# Patient Record
Sex: FEMALE | Race: WHITE | NOT HISPANIC OR LATINO | Employment: OTHER | URBAN - METROPOLITAN AREA
[De-identification: names, ages, dates, MRNs, and addresses within clinical notes are randomized per-mention and may not be internally consistent; named-entity substitution may affect disease eponyms.]

---

## 2023-08-01 ENCOUNTER — OFFICE VISIT (OUTPATIENT)
Dept: FAMILY MEDICINE CLINIC | Facility: CLINIC | Age: 68
End: 2023-08-01
Payer: MEDICARE

## 2023-08-01 VITALS
HEART RATE: 66 BPM | TEMPERATURE: 98.6 F | OXYGEN SATURATION: 96 % | RESPIRATION RATE: 16 BRPM | WEIGHT: 128.2 LBS | BODY MASS INDEX: 20.12 KG/M2 | SYSTOLIC BLOOD PRESSURE: 134 MMHG | DIASTOLIC BLOOD PRESSURE: 76 MMHG | HEIGHT: 67 IN

## 2023-08-01 DIAGNOSIS — R42 VERTIGO: ICD-10-CM

## 2023-08-01 DIAGNOSIS — Z13.820 SCREENING FOR OSTEOPOROSIS: ICD-10-CM

## 2023-08-01 DIAGNOSIS — Z12.11 SCREENING FOR COLON CANCER: ICD-10-CM

## 2023-08-01 DIAGNOSIS — Z78.0 ASYMPTOMATIC POSTMENOPAUSAL STATE: ICD-10-CM

## 2023-08-01 DIAGNOSIS — E55.9 VITAMIN D DEFICIENCY: ICD-10-CM

## 2023-08-01 DIAGNOSIS — Z12.31 ENCOUNTER FOR SCREENING MAMMOGRAM FOR BREAST CANCER: ICD-10-CM

## 2023-08-01 DIAGNOSIS — Z00.00 MEDICARE ANNUAL WELLNESS VISIT, SUBSEQUENT: Primary | ICD-10-CM

## 2023-08-01 PROCEDURE — G0438 PPPS, INITIAL VISIT: HCPCS | Performed by: FAMILY MEDICINE

## 2023-08-01 PROCEDURE — 99213 OFFICE O/P EST LOW 20 MIN: CPT | Performed by: FAMILY MEDICINE

## 2023-08-01 RX ORDER — MULTIVIT WITH MINERALS/LUTEIN
1000 TABLET ORAL DAILY
COMMUNITY

## 2023-08-01 RX ORDER — MECLIZINE HYDROCHLORIDE 25 MG/1
1 TABLET ORAL
COMMUNITY
Start: 2012-02-20

## 2023-08-01 RX ORDER — VITAMIN B COMPLEX
1 CAPSULE ORAL DAILY
COMMUNITY

## 2023-08-01 RX ORDER — CHOLECALCIFEROL (VITAMIN D3) 125 MCG
500 CAPSULE ORAL DAILY
COMMUNITY

## 2023-08-01 RX ORDER — MELATONIN
5000 DAILY
COMMUNITY

## 2023-08-01 NOTE — PATIENT INSTRUCTIONS
Medicare Preventive Visit Patient Instructions  Thank you for completing your Welcome to Medicare Visit or Medicare Annual Wellness Visit today. Your next wellness visit will be due in one year (8/1/2024). The screening/preventive services that you may require over the next 5-10 years are detailed below. Some tests may not apply to you based off risk factors and/or age. Screening tests ordered at today's visit but not completed yet may show as past due. Also, please note that scanned in results may not display below. Preventive Screenings:  Service Recommendations Previous Testing/Comments   Colorectal Cancer Screening  * Colonoscopy    * Fecal Occult Blood Test (FOBT)/Fecal Immunochemical Test (FIT)  * Fecal DNA/Cologuard Test  * Flexible Sigmoidoscopy Age: 43-73 years old   Colonoscopy: every 10 years (may be performed more frequently if at higher risk)  OR  FOBT/FIT: every 1 year  OR  Cologuard: every 3 years  OR  Sigmoidoscopy: every 5 years  Screening may be recommended earlier than age 39 if at higher risk for colorectal cancer. Also, an individualized decision between you and your healthcare provider will decide whether screening between the ages of 77-80 would be appropriate. Colonoscopy: Not on file  FOBT/FIT: Not on file  Cologuard: Not on file  Sigmoidoscopy: Not on file          Breast Cancer Screening Age: 36 years old  Frequency: every 1-2 years  Not required if history of left and right mastectomy Mammogram: Not on file        Cervical Cancer Screening Between the ages of 21-29, pap smear recommended once every 3 years. Between the ages of 32-69, can perform pap smear with HPV co-testing every 5 years.    Recommendations may differ for women with a history of total hysterectomy, cervical cancer, or abnormal pap smears in past. Pap Smear: Not on file    Screening Not Indicated   Hepatitis C Screening Once for adults born between [address] [place] [address]  More frequently in patients at high risk for Hepatitis C Hep C Antibody: Not on file        Diabetes Screening 1-2 times per year if you're at risk for diabetes or have pre-diabetes Fasting glucose: No results in last 5 years (No results in last 5 years)  A1C: No results in last 5 years (No results in last 5 years)      Cholesterol Screening Once every 5 years if you don't have a lipid disorder. May order more often based on risk factors. Lipid panel: Not on file          Other Preventive Screenings Covered by Medicare:  1. Abdominal Aortic Aneurysm (AAA) Screening: covered once if your at risk. You're considered to be at risk if you have a family history of AAA. 2. Lung Cancer Screening: covers low dose CT scan once per year if you meet all of the following conditions: (1) Age 48-67; (2) No signs or symptoms of lung cancer; (3) Current smoker or have quit smoking within the last 15 years; (4) You have a tobacco smoking history of at least 20 pack years (packs per day multiplied by number of years you smoked); (5) You get a written order from a healthcare provider. 3. Glaucoma Screening: covered annually if you're considered high risk: (1) You have diabetes OR (2) Family history of glaucoma OR (3)  aged 48 and older OR (3)  American aged 72 and older  3. Osteoporosis Screening: covered every 2 years if you meet one of the following conditions: (1) You're estrogen deficient and at risk for osteoporosis based off medical history and other findings; (2) Have a vertebral abnormality; (3) On glucocorticoid therapy for more than 3 months; (4) Have primary hyperparathyroidism; (5) On osteoporosis medications and need to assess response to drug therapy. · Last bone density test (DXA Scan): Not on file. 5. HIV Screening: covered annually if you're between the age of 14-79. Also covered annually if you are younger than 13 and older than 72 with risk factors for HIV infection.  For pregnant patients, it is covered up to 3 times per pregnancy. Immunizations:  Immunization Recommendations   Influenza Vaccine Annual influenza vaccination during flu season is recommended for all persons aged >= 6 months who do not have contraindications   Pneumococcal Vaccine   * Pneumococcal conjugate vaccine = PCV13 (Prevnar 13), PCV15 (Vaxneuvance), PCV20 (Prevnar 20)  * Pneumococcal polysaccharide vaccine = PPSV23 (Pneumovax) Adults 20-63 years old: 1-3 doses may be recommended based on certain risk factors  Adults 72 years old: 1-2 doses may be recommended based off what pneumonia vaccine you previously received   Hepatitis B Vaccine 3 dose series if at intermediate or high risk (ex: diabetes, end stage renal disease, liver disease)   Tetanus (Td) Vaccine - COST NOT COVERED BY MEDICARE PART B Following completion of primary series, a booster dose should be given every 10 years to maintain immunity against tetanus. Td may also be given as tetanus wound prophylaxis. Tdap Vaccine - COST NOT COVERED BY MEDICARE PART B Recommended at least once for all adults. For pregnant patients, recommended with each pregnancy. Shingles Vaccine (Shingrix) - COST NOT COVERED BY MEDICARE PART B  2 shot series recommended in those aged 48 and above     Health Maintenance Due:      Topic Date Due   • Hepatitis C Screening  Never done   • Breast Cancer Screening: Mammogram  Never done   • Colorectal Cancer Screening  Never done     Immunizations Due:      Topic Date Due   • COVID-19 Vaccine (1) Never done   • Pneumococcal Vaccine: 65+ Years (1 - PCV) Never done   • Influenza Vaccine (1) 09/01/2023     Advance Directives   What are advance directives? Advance directives are legal documents that state your wishes and plans for medical care. These plans are made ahead of time in case you lose your ability to make decisions for yourself. Advance directives can apply to any medical decision, such as the treatments you want, and if you want to donate organs.    What are the types of advance directives? There are many types of advance directives, and each state has rules about how to use them. You may choose a combination of any of the following:  · Living will: This is a written record of the treatment you want. You can also choose which treatments you do not want, which to limit, and which to stop at a certain time. This includes surgery, medicine, IV fluid, and tube feedings. · Durable power of  for healthcare [facility] [place]): This is a written record that states who you want to make healthcare choices for you when you are unable to make them for yourself. This person, called a proxy, is usually a family member or a friend. You may choose more than 1 proxy. · Do not resuscitate (DNR) order:  A DNR order is used in case your heart stops beating or you stop breathing. It is a request not to have certain forms of treatment, such as CPR. A DNR order may be included in other types of advance directives. · Medical directive: This covers the care that you want if you are in a coma, near death, or unable to make decisions for yourself. You can list the treatments you want for each condition. Treatment may include pain medicine, surgery, blood transfusions, dialysis, IV or tube feedings, and a ventilator (breathing machine). · Values history: This document has questions about your views, beliefs, and how you feel and think about life. This information can help others choose the care that you would choose. Why are advance directives important? An advance directive helps you control your care. Although spoken wishes may be used, it is better to have your wishes written down. Spoken wishes can be misunderstood, or not followed. Treatments may be given even if you do not want them. An advance directive may make it easier for your family to make difficult choices about your care.        © Copyright [employer] 2018 Information is for End User's use only and may not be sold, redistributed or otherwise used for commercial purposes.  All illustrations and images included in CareNotes® are the copyrighted property of [employer].D.A[employer]M., Inc. or [address] [place] [address]

## 2023-08-01 NOTE — ASSESSMENT & PLAN NOTE
Discussed recurrent symptoms. She has Meclizine for use as needed. Provided with information on Epley maneuver for home treatment during an episode and provided with referral for vestibular therapy.
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
## 2023-08-09 ENCOUNTER — EVALUATION (OUTPATIENT)
Dept: PHYSICAL THERAPY | Facility: CLINIC | Age: 68
End: 2023-08-09
Payer: MEDICARE

## 2023-08-09 DIAGNOSIS — R42 VERTIGO: Primary | ICD-10-CM

## 2023-08-09 PROCEDURE — 97162 PT EVAL MOD COMPLEX 30 MIN: CPT | Performed by: PHYSICAL THERAPIST

## 2023-08-09 NOTE — PROGRESS NOTES
PT Evaluation     Today's date: 2023  Patient name: Tia Love  : 1955  MRN: [id number]  Referring provider: Rupa Calvin DO  Dx:   Encounter Diagnosis     ICD-10-CM    1. Vertigo  R42 Ambulatory Referral to Physical Therapy                     Assessment  Assessment details: Pt is a 69y.o. year old female coming to outpatient PT with a diagnosis of vertigo. Pt presents with increased pain, mild cervical ROM deficits, increased dizziness or "swimminess", and overall decreased functional mobility. Pt would benefit from skilled PT services in order to address these deficits and reach maximum level of function. Thank you kindly for the referral!    Ortho therapist was not able to reproduce pt's dizziness symptoms. Recommend a consult with neuro PT to assess other vestibular disorders. Impairments: activity intolerance, lacks appropriate home exercise program and pain with function  Other impairment: dizziness  Understanding of Dx/Px/POC: good   Prognosis: good    Goals  STG's ( 3-4 weeks)  1. Pt will be independent in HEP  2.  Pt will have decreased dizziness re occurence  LTG's ( 6- 8 weeks)  1. Pt will feel more comfortable exercising with less dizziness  2. Pt will have less dizziness when looking up in the store or at the [employer]  3. Plan  Patient would benefit from: skilled physical therapy  Planned therapy interventions: home exercise program, therapeutic exercise, strengthening, functional ROM exercises, balance, manual therapy and neuromuscular re-education  Frequency: 2x week  Duration in weeks: 8  Plan of Care beginning date: 2023  Plan of Care expiration date: 10/4/2023  Treatment plan discussed with: patient        Subjective Evaluation    History of Present Illness  Mechanism of injury:  Pt reports she has been having reoccurring episodes of dizziness and vomiting since 2023. Pt has dizziness every day, but some days are are worse with vomiting.  Pt was given Meclizine when she went to Urgent Care, with some relief. Pt is now taking a homepathic medication from the [employer] store that seems to be helping more than the Meclizine. Pt feels "spacy and swimmy" when she wakes up in the morning. Pt has increased dizziness if she turns her head to quickly, rolling in bed, laying supine in bed. Pt denies dizziness when bending forward to put on her shoes and socks. Pt has increased dizziness when looking up at birds outside or looking up at [facility]. Pt has not been walking recently 2* not trusting herself with her balance and walking. Pt has had some neck pain 2* dizziness. Pt notes she had a R ear wax problem/ impacted wax.     Work: retired  Hobbies: sun bathing, swimming at [employer], walking, stepping machine at home  Gait: no abnormalities  Patient Goals  Patient goal: to decrease my dizziness  Pain  At best pain ratin  At worst pain ratin    Social Support    Employment status: not working  Treatments  Previous treatment: medication        Objective     Neurological Testing     Sensation   Cervical/Thoracic   Left   Intact: light touch    Right   Intact: light touch    Reflexes   Left   Biceps (C5/C6): normal (2+)  Brachioradialis (C6): normal (2+)  Triceps (C7): normal (2+)    Right   Biceps (C5/C6): normal (2+)  Brachioradialis (C6): normal (2+)  Triceps (C7): normal (2+)    Active Range of Motion   Cervical/Thoracic Spine       Cervical    Flexion:  WFL  Extension: 60 degrees      Left lateral flexion: 25 degrees      Right lateral flexion: 30 degrees      Left rotation:  WFL  Right rotation:  WFL  Left Shoulder   Normal active range of motion    Right Shoulder   Normal active range of motion    Additional Active Range of Motion Details  Vestibular Examination:    Primary Complaints:    Exacerbating Factors:    Relieving Factors:    Modified CT SIB testing:  EO on firm surface:    sway index  EC on firm surface:   sway index  EO on foam surface:   sway index  EC on form surface:   sway index    Spontaneous nystagmus room light: negative  Gaze holding nystagmus room light: negative  Skew deviation room light: negative  Smooth pursuits:  vertical: normal    Horizontal: normal  Horizontal head thrust test: normal  VOR cancellation: 10 reps with "swimminess"  Near point convergence: normal 4 cm   ( normal 4- 6 cm)  Oculomotor mobility: good  Sharp Guera test: negative  Vertebral Artery Test: negative  Mine Hallpike test: negative bilaterally      Strength/Myotome Testing     Left Shoulder   Normal muscle strength    Right Shoulder   Normal muscle strength        Dx: vertigo  EPOC:  CO-MORBIDITIES: chronic neck pain  PERSONAL FACTORS:    Precautions: h/o vomiting with dizziness      Manuals                                                                 Neuro Re-Ed             VOR x1 Horiz             VOR x1 vert             Sidestepping on foam             Tandem walking on foam             Walking with HT                                       Ther Ex                                                                                                                     Ther Activity                                       Gait Training                                       Modalities

## 2023-08-10 ENCOUNTER — HOSPITAL ENCOUNTER (OUTPATIENT)
Dept: BONE DENSITY | Facility: IMAGING CENTER | Age: 68
Discharge: HOME | End: 2023-08-10
Payer: MEDICARE

## 2023-08-10 VITALS — BODY MASS INDEX: 22.41 KG/M2 | HEIGHT: 63 IN | WEIGHT: 126.5 LBS

## 2023-08-10 DIAGNOSIS — Z78.0 ASYMPTOMATIC POSTMENOPAUSAL STATE: ICD-10-CM

## 2023-08-10 DIAGNOSIS — Z13.820 SCREENING FOR OSTEOPOROSIS: ICD-10-CM

## 2023-08-10 PROCEDURE — 77080 DXA BONE DENSITY AXIAL: CPT

## 2023-08-11 ENCOUNTER — APPOINTMENT (OUTPATIENT)
Dept: LAB | Facility: HOSPITAL | Age: 68
End: 2023-08-11
Payer: MEDICARE

## 2023-08-11 DIAGNOSIS — Z00.00 MEDICARE ANNUAL WELLNESS VISIT, SUBSEQUENT: ICD-10-CM

## 2023-08-11 DIAGNOSIS — E55.9 VITAMIN D DEFICIENCY: ICD-10-CM

## 2023-08-11 LAB
25(OH)D3 SERPL-MCNC: 69.1 NG/ML (ref 30–100)
ALBUMIN SERPL BCP-MCNC: 4.3 G/DL (ref 3.5–5)
ALP SERPL-CCNC: 53 U/L (ref 34–104)
ALT SERPL W P-5'-P-CCNC: 15 U/L (ref 7–52)
ANION GAP SERPL CALCULATED.3IONS-SCNC: 7 MMOL/L
AST SERPL W P-5'-P-CCNC: 18 U/L (ref 13–39)
BASOPHILS # BLD AUTO: 0.12 THOUSANDS/ÂΜL (ref 0–0.1)
BASOPHILS NFR BLD AUTO: 1 % (ref 0–1)
BILIRUB SERPL-MCNC: 0.57 MG/DL (ref 0.2–1)
BUN SERPL-MCNC: 12 MG/DL (ref 5–25)
CALCIUM SERPL-MCNC: 9.2 MG/DL (ref 8.4–10.2)
CHLORIDE SERPL-SCNC: 106 MMOL/L (ref 96–108)
CHOLEST SERPL-MCNC: 210 MG/DL
CO2 SERPL-SCNC: 26 MMOL/L (ref 21–32)
CREAT SERPL-MCNC: 0.71 MG/DL (ref 0.6–1.3)
EOSINOPHIL # BLD AUTO: 0.38 THOUSAND/ÂΜL (ref 0–0.61)
EOSINOPHIL NFR BLD AUTO: 4 % (ref 0–6)
ERYTHROCYTE [DISTWIDTH] IN BLOOD BY AUTOMATED COUNT: 13.7 % (ref 11.6–15.1)
GFR SERPL CREATININE-BSD FRML MDRD: 87 ML/MIN/1.73SQ M
GLUCOSE P FAST SERPL-MCNC: 89 MG/DL (ref 65–99)
HCT VFR BLD AUTO: 42.1 % (ref 34.8–46.1)
HDLC SERPL-MCNC: 47 MG/DL
HGB BLD-MCNC: 13.6 G/DL (ref 11.5–15.4)
IMM GRANULOCYTES # BLD AUTO: 0.02 THOUSAND/UL (ref 0–0.2)
IMM GRANULOCYTES NFR BLD AUTO: 0 % (ref 0–2)
LDLC SERPL CALC-MCNC: 134 MG/DL (ref 0–100)
LYMPHOCYTES # BLD AUTO: 3.54 THOUSANDS/ÂΜL (ref 0.6–4.47)
LYMPHOCYTES NFR BLD AUTO: 41 % (ref 14–44)
MCH RBC QN AUTO: 30.4 PG (ref 26.8–34.3)
MCHC RBC AUTO-ENTMCNC: 32.3 G/DL (ref 31.4–37.4)
MCV RBC AUTO: 94 FL (ref 82–98)
MONOCYTES # BLD AUTO: 0.69 THOUSAND/ÂΜL (ref 0.17–1.22)
MONOCYTES NFR BLD AUTO: 8 % (ref 4–12)
NEUTROPHILS # BLD AUTO: 3.8 THOUSANDS/ÂΜL (ref 1.85–7.62)
NEUTS SEG NFR BLD AUTO: 46 % (ref 43–75)
NRBC BLD AUTO-RTO: 0 /100 WBCS
PLATELET # BLD AUTO: 306 THOUSANDS/UL (ref 149–390)
PMV BLD AUTO: 10.3 FL (ref 8.9–12.7)
POTASSIUM SERPL-SCNC: 3.6 MMOL/L (ref 3.5–5.3)
PROT SERPL-MCNC: 7.4 G/DL (ref 6.4–8.4)
RBC # BLD AUTO: 4.47 MILLION/UL (ref 3.81–5.12)
SODIUM SERPL-SCNC: 139 MMOL/L (ref 135–147)
TRIGL SERPL-MCNC: 146 MG/DL
TSH SERPL DL<=0.05 MIU/L-ACNC: 1.75 UIU/ML (ref 0.45–4.5)
WBC # BLD AUTO: 8.55 THOUSAND/UL (ref 4.31–10.16)

## 2023-08-11 PROCEDURE — 84443 ASSAY THYROID STIM HORMONE: CPT

## 2023-08-11 PROCEDURE — 80053 COMPREHEN METABOLIC PANEL: CPT

## 2023-08-11 PROCEDURE — 82306 VITAMIN D 25 HYDROXY: CPT

## 2023-08-11 PROCEDURE — 83036 HEMOGLOBIN GLYCOSYLATED A1C: CPT

## 2023-08-11 PROCEDURE — 36415 COLL VENOUS BLD VENIPUNCTURE: CPT

## 2023-08-11 PROCEDURE — 80061 LIPID PANEL: CPT

## 2023-08-11 PROCEDURE — 85025 COMPLETE CBC W/AUTO DIFF WBC: CPT

## 2023-08-13 LAB
EST. AVERAGE GLUCOSE BLD GHB EST-MCNC: 120 MG/DL
HBA1C MFR BLD: 5.8 %

## 2023-08-17 ENCOUNTER — TELEPHONE (OUTPATIENT)
Dept: FAMILY MEDICINE CLINIC | Facility: CLINIC | Age: 68
End: 2023-08-17

## 2023-08-17 ENCOUNTER — EVALUATION (OUTPATIENT)
Facility: CLINIC | Age: 68
End: 2023-08-17
Payer: MEDICARE

## 2023-08-17 DIAGNOSIS — R42 VERTIGO: Primary | ICD-10-CM

## 2023-08-17 PROCEDURE — 97164 PT RE-EVAL EST PLAN CARE: CPT

## 2023-08-17 NOTE — PROGRESS NOTES
PT Re-Evaluation  / DISCHARGE    Today's date: 2023  Patient name: Collin Kern  : 1955  MRN: [id number]  Referring provider: Dung Azul DO  Dx:   Encounter Diagnosis     ICD-10-CM    1. Vertigo  R42           Start Time: 230  Stop Time: 315  Total time in clinic (min): 45 minutes    Assessment  Assessment details: Patient presents to Neuro PT for further assessment of vestibular complaints. Per her report, she has not been feeling dizzy over the last few days and has not been taking any medication (homeopathic or prescribed) for dizziness for the past week. Assessment for BPPV (-) in [place] and Roll testing. FGA completed with no instability noted scoring 30/30. Per patient, she has returned to all previous functional tasks and does not feel limited in her stability. Normal cervical motion, oculomotor tracking and balance tasks noted. No further treatment indicated at this time. She has been instructed in treatment for BPPV, suspect resolved BPPV. She is in agreement with no further treatment, and has been instructed to contact PT should her symptoms return. Goals  No goals as treatment not indicated at this time. Plan  Treatment plan discussed with: patient        Subjective Evaluation    History of Present Illness  Mechanism of injury: Patient states intermittent dizziness with changes in position, over last few days not noted. No present complaints of pain or limitations with ADL tasks. Patient Goals  Patient goal: to make sure there is nothing she should be doing to avoid dizziness from returning  Pain  Current pain ratin          Objective     Concurrent Complaints  Positive for hearing loss and aural fullness.  Negative for tinnitus, visual change, memory loss and poor concentration    Strength/Myotome Testing     Left Shoulder     Planes of Motion   Flexion: 4+   Abduction: 4+     Right Shoulder     Planes of Motion   Flexion: 4+   Abduction: 4+     Left Elbow   Flexion: 4+  Extension: 4+    Right Elbow   Flexion: 4+  Extension: 4+    Left Wrist/Hand   Wrist extension: 4+  Wrist flexion: 4+    Right Wrist/Hand   Wrist extension: 4+  Wrist flexion: 4+    Left Hip   Planes of Motion   Flexion: 4+  Extension: 4+  Abduction: 4+    Right Hip   Planes of Motion   Flexion: 4+  Extension: 4+  Abduction: 4+    Left Knee   Flexion: 4+  Extension: 4+    Right Knee   Flexion: 4+  Extension: 4+    Left Ankle/Foot   Dorsiflexion: 4+  Plantar flexion: 4+    Right Ankle/Foot   Dorsiflexion: 4+  Plantar flexion: 4+  Neuro Exam:     Dizziness  Negative for disequilibrium, vertigo, motion sickness, light-headedness, rocking or swaying, diplopia and floating or swimming. Exacerbating factors  Negative for bending over, rolling in bed, looking up, walking, turning head and supine to/from sitting. Symptoms   Intensity at best: 0/10    Cervical exam   Ligament Laxity Testing   Alar ligament: WNL  Sharp Guera:  WNL  Modified VBI   Left: asymptomatic  Right: asymptomatic  Seated posture: forward head posture    Oculomotor exam   Oculomotor ROM: WNL  Resting nystagmus: not present   Gaze holding nystagmus: not present left  and not present right  Smooth pursuits: within normal limits  Convergence: normal    Positional testing   Mine-Hallpike   Left posterior canal: WNL  Right posterior canal: WNL  Roll test   Left horizontal canal: WNL  Right horizontal canal: WNL  Positional testing comment: Merrill and Yaw (-), vignesh's (-)    Sensation   Light touch LE: left WNL and right WNL    Coordination   Finger to nose: left WNL and right WNL  Rapid alternating movements: UE WNL and LE impaired     Functional outcomes   Functional outcome gait comment: Normalized gait with no veering noted with EC or HT ambulation             Precautions:  H/o vertigo    TESTING 8/17            DHP/ Roll test (-)            Merrill and Yaw (-)            FGA 30/30                         Neuro Re-Ed             Vignesh's 2x2, no symptoms            Gait with HT No dizziness or veering evident                                                                             Ther Ex                                                                                                                     Ther Activity                                       Gait Training                                       Modalities

## 2023-08-17 NOTE — TELEPHONE ENCOUNTER
----- Message from Em Duke DO sent at 8/17/2023  2:48 PM EDT -----  Labs reviewed -   A1C is prediabetic range, please watch sugars and carbohydrates in the diet and ensure regular exercise. Lipid panel shows cholesterol is slightly elevated, good cholesterol is a bit low and bad cholesterol is high - again, diet and exercise can help improve these numbers.   CBC normal  Vitamin D normal  Thyroid function normal  Liver and kidney function normal

## 2023-10-05 ENCOUNTER — HOSPITAL ENCOUNTER (OUTPATIENT)
Dept: MAMMOGRAPHY | Facility: IMAGING CENTER | Age: 68
Discharge: HOME | End: 2023-10-05
Payer: MEDICARE

## 2023-10-05 VITALS — HEIGHT: 63 IN | WEIGHT: 127 LBS | BODY MASS INDEX: 22.5 KG/M2

## 2023-10-05 DIAGNOSIS — Z12.31 ENCOUNTER FOR SCREENING MAMMOGRAM FOR BREAST CANCER: ICD-10-CM

## 2023-10-05 PROCEDURE — 77067 SCR MAMMO BI INCL CAD: CPT

## 2023-10-05 PROCEDURE — 77063 BREAST TOMOSYNTHESIS BI: CPT

## 2023-10-28 LAB — COLOGUARD RESULT REPORTABLE: NEGATIVE

## 2024-02-26 ENCOUNTER — OFFICE VISIT (OUTPATIENT)
Dept: FAMILY MEDICINE CLINIC | Facility: CLINIC | Age: 69
End: 2024-02-26
Payer: COMMERCIAL

## 2024-02-26 ENCOUNTER — OFFICE VISIT (OUTPATIENT)
Age: 69
End: 2024-02-26
Payer: COMMERCIAL

## 2024-02-26 VITALS
HEIGHT: 62 IN | SYSTOLIC BLOOD PRESSURE: 122 MMHG | DIASTOLIC BLOOD PRESSURE: 64 MMHG | WEIGHT: 134.8 LBS | BODY MASS INDEX: 24.8 KG/M2

## 2024-02-26 VITALS
RESPIRATION RATE: 16 BRPM | HEART RATE: 68 BPM | HEIGHT: 63 IN | TEMPERATURE: 97.5 F | WEIGHT: 133 LBS | SYSTOLIC BLOOD PRESSURE: 130 MMHG | OXYGEN SATURATION: 99 % | DIASTOLIC BLOOD PRESSURE: 80 MMHG | BODY MASS INDEX: 23.57 KG/M2

## 2024-02-26 DIAGNOSIS — N90.4 LICHEN SCLEROSUS OF VULVA: Primary | ICD-10-CM

## 2024-02-26 DIAGNOSIS — N89.8 VAGINAL ITCHING: ICD-10-CM

## 2024-02-26 DIAGNOSIS — N89.8 VAGINAL SORE: Primary | ICD-10-CM

## 2024-02-26 PROCEDURE — 99203 OFFICE O/P NEW LOW 30 MIN: CPT | Performed by: OBSTETRICS & GYNECOLOGY

## 2024-02-26 PROCEDURE — 99213 OFFICE O/P EST LOW 20 MIN: CPT | Performed by: NURSE PRACTITIONER

## 2024-02-26 NOTE — PROGRESS NOTES
FAMILY PRACTICE OFFICE VISIT       NAME: Neema Leger  AGE: 68 y.o. SEX: female       : 1955        MRN: [id number]    Assessment and Plan   1. Vaginal sore  -     Ambulatory Referral to Obstetrics / Gynecology; Future    2. Vaginal itching  -     Ambulatory Referral to Obstetrics / Gynecology; Future       Vaginal sores, swelling, itching since December.   Apple cider vinegar, castor oil topically have helped.   She would like referral to gyn, which is provided today.   We discussed differential diagnoses such as atrophic vaginitis, lichen sclerosis, vulvar carcinoma. She is concerned about genital herpes, but given history she has not been sexually active for years, this is low on differential. She will let me know if she any difficulty obtaining gyn appointment.         Chief Complaint     Chief Complaint   Patient presents with    Vaginal Itching     Patient is here for vaginal itching, sores 2 + mos       History of Present Illness     Neema Leger is a 68 year old female presenting today for vaginal itching.    Gyn last visit 25 years ago.     Vaginal itching, sores, swelling.   Not sure about discharge--none daytime, night time not sure if sweating or discharge, gets really hot at night.     Started in Dcember.     Thought it was clothing, detergent, foods, but changes didn't improve symptoms.      Not sexually active for long time now.      Apple cider vinegar--hleps with sores and tiching  Castor oil gave relief.   Last week was doing well.   But then yesterday restarted.     Hot at night aggravates everything.                   Review of Systems   Review of Systems   Constitutional: Negative.    Genitourinary:  Positive for genital sores and vaginal pain.       I have reviewed the patient's medical history in detail; there are no changes to the history as noted in the electronic medical record.    Objective     Vitals:    24 0823   BP: 130/80   Pulse: 68   Resp: 16   Temp: 97.5 °F (36.4 °C)  "  TempSrc: Temporal   SpO2: 99%   Weight: 60.3 kg (133 lb)   Height: 5' 3\" (1.6 m)     Wt Readings from Last 3 Encounters:   02/26/24 61.1 kg (134 lb 12.8 oz)   02/26/24 60.3 kg (133 lb)   10/05/23 57.6 kg (127 lb)     Physical Exam  Vitals and nursing note reviewed.   Constitutional:       General: She is not in acute distress.     Appearance: Normal appearance. She is not ill-appearing.   Cardiovascular:      Rate and Rhythm: Normal rate.   Pulmonary:      Effort: Pulmonary effort is normal. No respiratory distress.   Neurological:      Mental Status: She is alert.   Psychiatric:         Mood and Affect: Mood normal.         Depression Screening and Follow-up Plan: Patient was screened for depression during today's encounter. They screened negative with a PHQ-2 score of 0.        ALLERGIES:  Allergies   Allergen Reactions    Codeine Other (See Comments)     Reaction Date: 20Feb2012;       Current Medications     Current Outpatient Medications   Medication Sig Dispense Refill    Ascorbic Acid (vitamin C) 1000 MG tablet Take 1,000 mg by mouth daily      b complex vitamins capsule Take 1 capsule by mouth daily      cholecalciferol (VITAMIN D3) 1,000 units tablet Take 5,000 Units by mouth daily      Magnesium-Potassium 250-100 MG TABS Take by mouth      NON FORMULARY Cell food      NON FORMULARY Thyroid support      betamethasone valerate (VALISONE) 0.1 % cream Apply topically 2 (two) times a day for 14 days 45 g 0    meclizine (ANTIVERT) 25 mg tablet Take 1 tablet by mouth (Patient not taking: Reported on 8/1/2023)      Multiple Vitamins-Minerals (Energy Booster) PACK Take by mouth Energy 2000 liquid       No current facility-administered medications for this visit.         Health Maintenance     Health Maintenance   Topic Date Due    Hepatitis C Screening  Never done    Pneumococcal Vaccine: 65+ Years (1 of 2 - PCV) Never done    Zoster Vaccine (1 of 2) Never done    COVID-19 Vaccine (1 - 2023-24 season) Never done "    PT PLAN OF CARE  09/08/2023    Influenza Vaccine (1) 06/30/2024 (Originally 9/1/2023)    Urinary Incontinence Screening  08/01/2024    Medicare Annual Wellness Visit (AWV)  08/01/2024    Fall Risk  08/09/2024    Breast Cancer Screening: Mammogram  10/05/2024    Depression Screening  02/26/2025    Colorectal Cancer Screening  10/23/2026    Osteoporosis Screening  Completed    HIB Vaccine  Aged Out    IPV Vaccine  Aged Out    Hepatitis A Vaccine  Aged Out    Meningococcal ACWY Vaccine  Aged Out    HPV Vaccine  Aged Out       There is no immunization history on file for this patient.    SAVANNAH Stover

## 2024-02-27 NOTE — PROGRESS NOTES
"Gynecology   Neema Leger 68 y.o. female MRN: [id number]      Assessment/Plan     Lichen sclerosus of vulva    - betamethasone valerate (VALISONE) 0.1 % cream; Apply topically 2 (two) times a day for 14 days  Dispense: 45 g; Refill: 0  - recheck 2 weeks    HPI:  Neema Leger is a 68 y.o. female who presents with two month h/o vulvar itching and now \"sores.\"  Has tried going without clothing at night, apple cider vinegar and castor oil with  some temporary relief.  No VB.  No recent antibiotics or new hygiene products.          Historical Information   Past Medical History:   Diagnosis Date    Seasonal allergies     Vertigo      Past Surgical History:   Procedure Laterality Date    DILATION AND CURETTAGE OF UTERUS      DILATION AND CURETTAGE OF UTERUS      SAB    TONSILECTOMY AND ADNOIDECTOMY      TUBAL LIGATION      VEIN SURGERY Right     Leg     OB/GYN History:   OB History          6    Para   1    Term   0       0    AB   5    Living   1         SAB   5    IAB   0    Ectopic   0    Multiple   0    Live Births   1                 Family History   Problem Relation Age of Onset    Breast cancer Mother         70's    Heart block Father     Breast cancer Sister 60    Diabetes Sister     Cancer Sister     No Known Problems Sister     Lung cancer Sister 72    No Known Problems Maternal Grandmother     No Known Problems Maternal Grandfather     No Known Problems Paternal Grandmother     No Known Problems Paternal Grandfather     No Known Problems Maternal Aunt     No Known Problems Maternal Aunt     No Known Problems Maternal Aunt     No Known Problems Paternal Aunt      Social History   Social History     Substance and Sexual Activity   Alcohol Use Not Currently     Social History     Substance and Sexual Activity   Drug Use Never     Social History     Tobacco Use   Smoking Status Some Days    Current packs/day: 0.30    Types: Cigarettes   Smokeless Tobacco Never     E-Cigarette/Vaping    E-Cigarette Use " "Never User      E-Cigarette/Vaping Substances    Nicotine No     THC No     CBD No     Flavoring No     Other No     Unknown No        Meds/Allergies   Current Outpatient Medications on File Prior to Visit   Medication Sig    Ascorbic Acid (vitamin C) 1000 MG tablet Take 1,000 mg by mouth daily    b complex vitamins capsule Take 1 capsule by mouth daily    cholecalciferol (VITAMIN D3) 1,000 units tablet Take 5,000 Units by mouth daily    Magnesium-Potassium 250-100 MG TABS Take by mouth    Multiple Vitamins-Minerals (Energy Booster) PACK Take by mouth Energy 2000 liquid    NON FORMULARY Cell food    NON FORMULARY Thyroid support    meclizine (ANTIVERT) 25 mg tablet Take 1 tablet by mouth (Patient not taking: Reported on 8/1/2023)     No current facility-administered medications on file prior to visit.       Allergies   Allergen Reactions    Codeine Other (See Comments)     Reaction Date: 20Feb2012;     Review of Systems   Constitutional: Positive for night sweats. Negative for fever, malaise/fatigue and weight loss.   Gastrointestinal:  Negative for bloating, abdominal pain, change in bowel habit and nausea.   Genitourinary:  Negative for genital sores, hematuria, non-menstrual bleeding and pelvic pain.   Neurological:  Negative for dizziness, headaches, light-headedness and weakness.           Objective   Vitals: Blood pressure 122/64, height 5' 2\" (1.575 m), weight 61.1 kg (134 lb 12.8 oz).    Physical Exam  Constitutional:       Appearance: Normal appearance.   Genitourinary:      Urethral meatus normal.      No lesions in the vagina.      Right Labia: rash and skin changes.      Right Labia: No tenderness or lesions.     Left Labia: skin changes and rash.      Left Labia: No tenderness or lesions.     Labial fusion present.      No inguinal adenopathy present in the right or left side.     No vaginal discharge or bleeding.   HENT:      Head: Normocephalic.   Cardiovascular:      Rate and Rhythm: Normal rate and " regular rhythm.   Pulmonary:      Effort: Pulmonary effort is normal.   Abdominal:      Palpations: Abdomen is soft.      Tenderness: There is no abdominal tenderness.   Musculoskeletal:         General: No swelling.   Lymphadenopathy:      Lower Body: No right inguinal adenopathy. No left inguinal adenopathy.   Neurological:      General: No focal deficit present.      Mental Status: She is alert and oriented to person, place, and time.   Skin:     General: Skin is warm and dry.   Psychiatric:         Mood and Affect: Mood normal.         Behavior: Behavior normal.   Vitals reviewed.

## 2024-03-20 ENCOUNTER — OFFICE VISIT (OUTPATIENT)
Age: 69
End: 2024-03-20
Payer: MEDICARE

## 2024-03-20 VITALS
HEIGHT: 62 IN | SYSTOLIC BLOOD PRESSURE: 112 MMHG | WEIGHT: 132 LBS | DIASTOLIC BLOOD PRESSURE: 62 MMHG | BODY MASS INDEX: 24.29 KG/M2

## 2024-03-20 DIAGNOSIS — N90.4 LICHEN SCLEROSUS OF VULVA: ICD-10-CM

## 2024-03-20 PROCEDURE — 99213 OFFICE O/P EST LOW 20 MIN: CPT | Performed by: OBSTETRICS & GYNECOLOGY

## 2024-03-21 NOTE — PROGRESS NOTES
GYN Follow Up Visit    HPI: Patient is here for vulvar recheck s/p BMZ x 2 weeks.  Notes complete resolution of itching and sores.  No VB.  No urinary symptoms.    PMH, PSH, Meds, Allergies, SocHx, FamHx reviewed and no changes noted.    ROS:  pertinent findings in HPI    Vitals:    03/20/24 0953   BP: 112/62       Physical Exam  Constitutional:       Appearance: Normal appearance.   Genitourinary:      Right Labia: No rash, tenderness, lesions or skin changes.     Left Labia: No tenderness, lesions, skin changes or rash.     No labial fusion noted.      No inguinal adenopathy present in the right or left side.  HENT:      Head: Normocephalic.   Cardiovascular:      Rate and Rhythm: Normal rate and regular rhythm.   Pulmonary:      Effort: Pulmonary effort is normal.   Abdominal:      General: There is no distension.   Musculoskeletal:         General: No swelling.   Lymphadenopathy:      Lower Body: No right inguinal adenopathy. No left inguinal adenopathy.   Neurological:      General: No focal deficit present.      Mental Status: She is alert and oriented to person, place, and time.   Skin:     General: Skin is warm and dry.   Psychiatric:         Mood and Affect: Mood normal.         Behavior: Behavior normal.   Vitals reviewed.      Impression/Plan:    Resolved lichen sclerosus    - can stay off of BMZ for now  - vulvar care reviewed  - RTO if symptoms recur

## 2024-05-31 ENCOUNTER — VBI (OUTPATIENT)
Dept: ADMINISTRATIVE | Facility: OTHER | Age: 69
End: 2024-05-31

## 2024-08-09 ENCOUNTER — VBI (OUTPATIENT)
Dept: FAMILY MEDICINE CLINIC | Facility: CLINIC | Age: 69
End: 2024-08-09

## 2024-08-09 NOTE — TELEPHONE ENCOUNTER
#1 Called patient and leave a detailed message for patient to call back the office to schedule Medicare Annual Wellness Visit with provider

## 2024-09-05 ENCOUNTER — TELEPHONE (OUTPATIENT)
Dept: FAMILY MEDICINE CLINIC | Facility: CLINIC | Age: 69
End: 2024-09-05

## 2024-09-05 NOTE — TELEPHONE ENCOUNTER
Voicemail message left for the patient in attempt to schedule their overdue Medicare Annual Wellness Visit.

## 2024-09-12 ENCOUNTER — VBI (OUTPATIENT)
Dept: ADMINISTRATIVE | Facility: OTHER | Age: 69
End: 2024-09-12

## 2024-09-12 NOTE — TELEPHONE ENCOUNTER
09/12/24 11:17 AM     Chart reviewed for CRC: Colonoscopy ; nothing is submitted to the patient's insurance at this time.     Rissa Irwin MA   [facility]

## 2024-09-25 ENCOUNTER — TELEPHONE (OUTPATIENT)
Dept: FAMILY MEDICINE CLINIC | Facility: CLINIC | Age: 69
End: 2024-09-25

## 2024-09-27 ENCOUNTER — TELEPHONE (OUTPATIENT)
Dept: FAMILY MEDICINE CLINIC | Facility: CLINIC | Age: 69
End: 2024-09-27

## 2024-10-31 ENCOUNTER — APPOINTMENT (OUTPATIENT)
Dept: LAB | Facility: CLINIC | Age: 69
End: 2024-10-31
Payer: MEDICARE

## 2024-10-31 ENCOUNTER — OFFICE VISIT (OUTPATIENT)
Dept: FAMILY MEDICINE CLINIC | Facility: CLINIC | Age: 69
End: 2024-10-31
Payer: MEDICARE

## 2024-10-31 VITALS
SYSTOLIC BLOOD PRESSURE: 110 MMHG | OXYGEN SATURATION: 99 % | RESPIRATION RATE: 16 BRPM | HEIGHT: 62 IN | TEMPERATURE: 97.6 F | HEART RATE: 67 BPM | WEIGHT: 134 LBS | BODY MASS INDEX: 24.66 KG/M2 | DIASTOLIC BLOOD PRESSURE: 70 MMHG

## 2024-10-31 DIAGNOSIS — E78.5 HYPERLIPIDEMIA, UNSPECIFIED HYPERLIPIDEMIA TYPE: ICD-10-CM

## 2024-10-31 DIAGNOSIS — R73.03 PREDIABETES: ICD-10-CM

## 2024-10-31 DIAGNOSIS — Z00.00 MEDICARE ANNUAL WELLNESS VISIT, SUBSEQUENT: Primary | ICD-10-CM

## 2024-10-31 DIAGNOSIS — M62.838 NECK MUSCLE SPASM: ICD-10-CM

## 2024-10-31 DIAGNOSIS — Z12.31 ENCOUNTER FOR SCREENING MAMMOGRAM FOR BREAST CANCER: ICD-10-CM

## 2024-10-31 LAB
CHOLEST SERPL-MCNC: 231 MG/DL
HDLC SERPL-MCNC: 43 MG/DL
LDLC SERPL CALC-MCNC: 148 MG/DL (ref 0–100)
NONHDLC SERPL-MCNC: 188 MG/DL
TRIGL SERPL-MCNC: 199 MG/DL

## 2024-10-31 PROCEDURE — 99213 OFFICE O/P EST LOW 20 MIN: CPT | Performed by: FAMILY MEDICINE

## 2024-10-31 PROCEDURE — 83036 HEMOGLOBIN GLYCOSYLATED A1C: CPT

## 2024-10-31 PROCEDURE — G0439 PPPS, SUBSEQ VISIT: HCPCS | Performed by: FAMILY MEDICINE

## 2024-10-31 PROCEDURE — 36415 COLL VENOUS BLD VENIPUNCTURE: CPT

## 2024-10-31 PROCEDURE — 80061 LIPID PANEL: CPT

## 2024-10-31 RX ORDER — CYCLOBENZAPRINE HCL 10 MG
10 TABLET ORAL 3 TIMES DAILY PRN
Qty: 30 TABLET | Refills: 0 | Status: SHIPPED | OUTPATIENT
Start: 2024-10-31

## 2024-10-31 NOTE — PATIENT INSTRUCTIONS
Medicare Preventive Visit Patient Instructions  Thank you for completing your Welcome to Medicare Visit or Medicare Annual Wellness Visit today. Your next wellness visit will be due in one year (11/1/2025).  The screening/preventive services that you may require over the next 5-10 years are detailed below. Some tests may not apply to you based off risk factors and/or age. Screening tests ordered at today's visit but not completed yet may show as past due. Also, please note that scanned in results may not display below.  Preventive Screenings:  Service Recommendations Previous Testing/Comments   Colorectal Cancer Screening  * Colonoscopy    * Fecal Occult Blood Test (FOBT)/Fecal Immunochemical Test (FIT)  * Fecal DNA/Cologuard Test  * Flexible Sigmoidoscopy Age: 45-75 years old   Colonoscopy: every 10 years (may be performed more frequently if at higher risk)  OR  FOBT/FIT: every 1 year  OR  Cologuard: every 3 years  OR  Sigmoidoscopy: every 5 years  Screening may be recommended earlier than age 45 if at higher risk for colorectal cancer. Also, an individualized decision between you and your healthcare provider will decide whether screening between the ages of 76-85 would be appropriate. Colonoscopy: Not on file  FOBT/FIT: Not on file  Cologuard: 10/23/2023  Sigmoidoscopy: Not on file    Screening Current     Breast Cancer Screening Age: 40+ years old  Frequency: every 1-2 years  Not required if history of left and right mastectomy Mammogram: 10/05/2023    Screening Current   Cervical Cancer Screening Between the ages of 21-29, pap smear recommended once every 3 years.   Between the ages of 30-65, can perform pap smear with HPV co-testing every 5 years.   Recommendations may differ for women with a history of total hysterectomy, cervical cancer, or abnormal pap smears in past. Pap Smear: Not on file    Screening Not Indicated   Hepatitis C Screening Once for adults born between 1945 and 1965  More frequently in  patients at high risk for Hepatitis C Hep C Antibody: Not on file        Diabetes Screening 1-2 times per year if you're at risk for diabetes or have pre-diabetes Fasting glucose: 89 mg/dL (8/11/2023)  A1C: 5.8 % (8/11/2023)      Cholesterol Screening Once every 5 years if you don't have a lipid disorder. May order more often based on risk factors. Lipid panel: 08/11/2023    Screening Current     Other Preventive Screenings Covered by Medicare:  Abdominal Aortic Aneurysm (AAA) Screening: covered once if your at risk. You're considered to be at risk if you have a family history of AAA.  Lung Cancer Screening: covers low dose CT scan once per year if you meet all of the following conditions: (1) Age 55-77; (2) No signs or symptoms of lung cancer; (3) Current smoker or have quit smoking within the last 15 years; (4) You have a tobacco smoking history of at least 20 pack years (packs per day multiplied by number of years you smoked); (5) You get a written order from a healthcare provider.  Glaucoma Screening: covered annually if you're considered high risk: (1) You have diabetes OR (2) Family history of glaucoma OR (3)  aged 50 and older OR (4)  American aged 65 and older  Osteoporosis Screening: covered every 2 years if you meet one of the following conditions: (1) You're estrogen deficient and at risk for osteoporosis based off medical history and other findings; (2) Have a vertebral abnormality; (3) On glucocorticoid therapy for more than 3 months; (4) Have primary hyperparathyroidism; (5) On osteoporosis medications and need to assess response to drug therapy.   Last bone density test (DXA Scan): 08/10/2023.  HIV Screening: covered annually if you're between the age of 15-65. Also covered annually if you are younger than 15 and older than 65 with risk factors for HIV infection. For pregnant patients, it is covered up to 3 times per pregnancy.    Immunizations:  Immunization Recommendations    Influenza Vaccine Annual influenza vaccination during flu season is recommended for all persons aged >= 6 months who do not have contraindications   Pneumococcal Vaccine   * Pneumococcal conjugate vaccine = PCV13 (Prevnar 13), PCV15 (Vaxneuvance), PCV20 (Prevnar 20)  * Pneumococcal polysaccharide vaccine = PPSV23 (Pneumovax) Adults 19-63 yo with certain risk factors or if 65+ yo  If never received any pneumonia vaccine: recommend Prevnar 20 (PCV20)  Give PCV20 if previously received 1 dose of PCV13 or PPSV23   Hepatitis B Vaccine 3 dose series if at intermediate or high risk (ex: diabetes, end stage renal disease, liver disease)   Respiratory syncytial virus (RSV) Vaccine - COVERED BY MEDICARE PART D  * RSVPreF3 (Arexvy) CDC recommends that adults 60 years of age and older may receive a single dose of RSV vaccine using shared clinical decision-making (SCDM)   Tetanus (Td) Vaccine - COST NOT COVERED BY MEDICARE PART B Following completion of primary series, a booster dose should be given every 10 years to maintain immunity against tetanus. Td may also be given as tetanus wound prophylaxis.   Tdap Vaccine - COST NOT COVERED BY MEDICARE PART B Recommended at least once for all adults. For pregnant patients, recommended with each pregnancy.   Shingles Vaccine (Shingrix) - COST NOT COVERED BY MEDICARE PART B  2 shot series recommended in those 19 years and older who have or will have weakened immune systems or those 50 years and older     Health Maintenance Due:      Topic Date Due   • Hepatitis C Screening  Never done   • Breast Cancer Screening: Mammogram  10/05/2024   • Colorectal Cancer Screening  10/23/2026     Immunizations Due:      Topic Date Due   • Pneumococcal Vaccine: 65+ Years (1 of 2 - PCV) Never done   • Influenza Vaccine (1) Never done   • COVID-19 Vaccine (1 - 2023-24 season) Never done     Advance Directives   What are advance directives?  Advance directives are legal documents that state your wishes  and plans for medical care. These plans are made ahead of time in case you lose your ability to make decisions for yourself. Advance directives can apply to any medical decision, such as the treatments you want, and if you want to donate organs.   What are the types of advance directives?  There are many types of advance directives, and each state has rules about how to use them. You may choose a combination of any of the following:  Living will:  This is a written record of the treatment you want. You can also choose which treatments you do not want, which to limit, and which to stop at a certain time. This includes surgery, medicine, IV fluid, and tube feedings.   Durable power of  for healthcare (DPAHC):  This is a written record that states who you want to make healthcare choices for you when you are unable to make them for yourself. This person, called a proxy, is usually a family member or a friend. You may choose more than 1 proxy.  Do not resuscitate (DNR) order:  A DNR order is used in case your heart stops beating or you stop breathing. It is a request not to have certain forms of treatment, such as CPR. A DNR order may be included in other types of advance directives.  Medical directive:  This covers the care that you want if you are in a coma, near death, or unable to make decisions for yourself. You can list the treatments you want for each condition. Treatment may include pain medicine, surgery, blood transfusions, dialysis, IV or tube feedings, and a ventilator (breathing machine).  Values history:  This document has questions about your views, beliefs, and how you feel and think about life. This information can help others choose the care that you would choose.  Why are advance directives important?  An advance directive helps you control your care. Although spoken wishes may be used, it is better to have your wishes written down. Spoken wishes can be misunderstood, or not followed.  Treatments may be given even if you do not want them. An advance directive may make it easier for your family to make difficult choices about your care.   Cigarette Smoking and Your Health   Risks to your health if you smoke:  Nicotine and other chemicals found in tobacco damage every cell in your body. Even if you are a light smoker, you have an increased risk for cancer, heart disease, and lung disease. If you are pregnant or have diabetes, smoking increases your risk for complications.   Benefits to your health if you stop smoking:   You decrease respiratory symptoms such as coughing, wheezing, and shortness of breath.   You reduce your risk for cancers of the lung, mouth, throat, kidney, bladder, pancreas, stomach, and cervix. If you already have cancer, you increase the benefits of chemotherapy. You also reduce your risk for cancer returning or a second cancer from developing.   You reduce your risk for heart disease, blood clots, heart attack, and stroke.   You reduce your risk for lung infections, and diseases such as pneumonia, asthma, chronic bronchitis, and emphysema.  Your circulation improves. More oxygen can be delivered to your body. If you have diabetes, you lower your risk for complications, such as kidney, artery, and eye diseases. You also lower your risk for nerve damage. Nerve damage can lead to amputations, poor vision, and blindness.  You improve your body's ability to heal and to fight infections.  For more information and support to stop smoking:   Smokefree.gov  Phone: 1- 992 - 988-8994  Web Address: www.smokefree.gov     © Copyright [employer] 2018 Information is for End User's use only and may not be sold, redistributed or otherwise used for commercial purposes. All illustrations and images included in CareNotes® are the copyrighted property of [employer] Inc. or [employer]

## 2024-10-31 NOTE — PROGRESS NOTES
"Ambulatory Visit  Name: Neema Leger      : 1955      MRN: [id number]  Encounter Provider: Kathi Vance DO  Encounter Date: 10/31/2024   Encounter department: [facility]    Assessment & Plan  Medicare annual wellness visit, subsequent         Hyperlipidemia, unspecified hyperlipidemia type    Orders:    Lipid panel; Future    Prediabetes    Orders:    Hemoglobin A1C; Future    Encounter for screening mammogram for breast cancer    Orders:    Mammo screening bilateral w 3d and cad; Future    Neck muscle spasm  A lot of stressors contributing to upper back and neck tension. Would like to try PT and chiropractic treatment. Can use Flexeril as needed as well. Heat and ice. Gentle home stretching. Follow up as needed.  Orders:    Ambulatory Referral to Physical Therapy; Future    Ambulatory Referral to Chiropractic; Future    cyclobenzaprine (FLEXERIL) 10 mg tablet; Take 1 tablet (10 mg total) by mouth 3 (three) times a day as needed for muscle spasms       Preventive health issues were discussed with patient, and age appropriate screening tests were ordered as noted in patient's After Visit Summary. Personalized health advice and appropriate referrals for health education or preventive services given if needed, as noted in patient's After Visit Summary.    History of Present Illness     HPI   Here for AWV. Declines mammo this year, will go q2y.  \"Back is a mess\" and was seeing chiropractor. Since summer under a lot of stress, taking care of sick friends, feels like stress is causing her neck pain and muscle spasms. Has had previous muscle spasms. Would like to try PT.    Patient Care Team:  Kathi Vance DO as PCP - General (Family Medicine)    Review of Systems  Medical History Reviewed by provider this encounter:  Meds       Annual Wellness Visit Questionnaire   Neema is here for her Subsequent Wellness visit.     Health Risk Assessment:   Patient rates overall health as " very good. Patient feels that their physical health rating is much better. Patient is very satisfied with their life. Eyesight was rated as same. Hearing was rated as same. Patient feels that their emotional and mental health rating is much better. Patients states they are sometimes angry. Patient states they are sometimes unusually tired/fatigued. Pain experienced in the last 7 days has been some. Patient's pain rating has been 3/10. Patient states that she has experienced weight loss or gain in last 6 months.     Depression Screening:   PHQ-2 Score: 0      Fall Risk Screening:   In the past year, patient has experienced: no history of falling in past year      Urinary Incontinence Screening:   Patient has not leaked urine accidently in the last six months.     Home Safety:  Patient does not have trouble with stairs inside or outside of their home. Patient has working smoke alarms and has working carbon monoxide detector. Home safety hazards include: none.     Nutrition:   Current diet is Regular. healthy    Medications:   Patient is currently taking over-the-counter supplements. OTC medications include: see medication list. Patient is able to manage medications.     Activities of Daily Living (ADLs)/Instrumental Activities of Daily Living (IADLs):   Walk and transfer into and out of bed and chair?: Yes  Dress and groom yourself?: Yes    Bathe or shower yourself?: Yes    Feed yourself? Yes  Do your laundry/housekeeping?: Yes  Manage your money, pay your bills and track your expenses?: Yes  Make your own meals?: Yes    Do your own shopping?: Yes    Previous Hospitalizations:   Any hospitalizations or ED visits within the last 12 months?: No      Advance Care Planning:   Living will: Yes    Durable POA for healthcare: Yes    Advanced directive: Yes      PREVENTIVE SCREENINGS      Cardiovascular Screening:    General: Screening Current      Colorectal Cancer Screening:     General: Screening Current      Breast Cancer  "Screening:     General: Screening Current      Cervical Cancer Screening:    General: Screening Not Indicated      Lung Cancer Screening:     General: Screening Not Indicated    Screening, Brief Intervention, and Referral to Treatment (SBIRT)    Screening  Typical number of drinks in a day: 0  Typical number of drinks in a week: 0  Interpretation: Low risk drinking behavior.    Single Item Drug Screening:  How often have you used an illegal drug (including marijuana) or a prescription medication for non-medical reasons in the past year? never    Single Item Drug Screen Score: 0  Interpretation: Negative screen for possible drug use disorder    Social Determinants of Health     Financial Resource Strain: Low Risk  (8/1/2023)    Overall Financial Resource Strain (CARDIA)     Difficulty of Paying Living Expenses: Not hard at all   Food Insecurity: No Food Insecurity (10/31/2024)    Hunger Vital Sign     Worried About Running Out of Food in the Last Year: Never true     Ran Out of Food in the Last Year: Never true   Transportation Needs: No Transportation Needs (10/31/2024)    PRAPARE - Transportation     Lack of Transportation (Medical): No     Lack of Transportation (Non-Medical): No   Housing Stability: Low Risk  (10/31/2024)    Housing Stability Vital Sign     Unable to Pay for Housing in the Last Year: No     Number of Times Moved in the Last Year: 1     Homeless in the Last Year: No   Utilities: Not At Risk (10/31/2024)    [facility] Utilities     Threatened with loss of utilities: No     No results found.    Objective     /70   Pulse 67   Temp 97.6 °F (36.4 °C) (Temporal)   Resp 16   Ht 5' 2\" (1.575 m)   Wt 60.8 kg (134 lb)   SpO2 99%   BMI 24.51 kg/m²     Physical Exam  Vitals reviewed.   Constitutional:       Appearance: Normal appearance.   HENT:      Right Ear: External ear normal.      Left Ear: External ear normal.      Mouth/Throat:      Mouth: Mucous membranes are moist.      Pharynx: Oropharynx is " clear.   Eyes:      Extraocular Movements: Extraocular movements intact.      Conjunctiva/sclera: Conjunctivae normal.   Cardiovascular:      Rate and Rhythm: Normal rate and regular rhythm.      Heart sounds: Normal heart sounds.   Pulmonary:      Effort: Pulmonary effort is normal.      Breath sounds: Normal breath sounds.   Abdominal:      General: Abdomen is flat.   Musculoskeletal:      Comments: Bilateral neck muscle tension and upper back tenderpoints   Skin:     General: Skin is warm and dry.      Capillary Refill: Capillary refill takes less than 2 seconds.   Neurological:      Mental Status: She is alert and oriented to person, place, and time.   Psychiatric:         Mood and Affect: Mood normal.         Behavior: Behavior normal.

## 2024-11-01 LAB
EST. AVERAGE GLUCOSE BLD GHB EST-MCNC: 123 MG/DL
HBA1C MFR BLD: 5.9 %

## 2024-11-14 ENCOUNTER — EVALUATION (OUTPATIENT)
Dept: PHYSICAL THERAPY | Facility: CLINIC | Age: 69
End: 2024-11-14
Payer: COMMERCIAL

## 2024-11-14 DIAGNOSIS — M62.838 NECK MUSCLE SPASM: ICD-10-CM

## 2024-11-14 PROCEDURE — 97161 PT EVAL LOW COMPLEX 20 MIN: CPT

## 2024-11-14 NOTE — PROGRESS NOTES
PT Evaluation     Today's date: 2024  Patient name: Neema Leger  : 1955  MRN: [id number]  Referring provider: Kathi Vance DO  Dx:   Encounter Diagnosis     ICD-10-CM    1. Neck muscle spasm  M62.838 Ambulatory Referral to Physical Therapy                     Assessment  Impairments: abnormal muscle firing, abnormal or restricted ROM, activity intolerance, impaired physical strength, lacks appropriate home exercise program, pain with function, scapular dyskinesis, poor posture  and poor body mechanics    Assessment details: Neema Leger is a 68 y.o. female who presents with signs and symptoms consistent with the referring diagnosis of neck muscle spasm. Patient presents with the following impairments: neck pain, radicular symptoms to both hands, limited cervical/thoracic AROM, and postural dysfunction. Due to these impairments, patient has difficulty performing the following: reaching overhead, lifting/carrying, pushing/pulling, household chores, self-care activities, and shopping. Patient has been educated in home exercise program and plan of care. Patient would benefit from skilled physical therapy services to address the above functional limitations and progress towards prior level of function and independence with home exercise program.  Understanding of Dx/Px/POC: good     Prognosis: good    Goals  Short Term Goals [3 weeks; target date: 12/15/24]  1. Patient will be independent with initial HEP.  2. Patient will demonstrate an increase in cervical AROM by 5 degrees in all planes.  3. Patient will present with moderate restriction in thoracic extension ROM.     Long Term Goals [6 weeks; target date: 25]  1. Patient will demonstrate an increase in cervical AROM to WNL in order to promote self-care activities pain-free.  2. Patient will present with mild restriction in thoracic ROM.   3. Patient will be able to self-correct posture in the clinic 75% of the time or greater.  4. Patient  will be independent with comprehensive HEP.     Plan  Patient would benefit from: skilled physical therapy  Planned modality interventions: cryotherapy, electrical stimulation/Russian stimulation, TENS, ultrasound, thermotherapy: hydrocollator packs, traction, high voltage pulsed current: spasm management and high voltage pulsed current: pain management    Planned therapy interventions: flexibility, functional ROM exercises, graded exercise, home exercise program, joint mobilization, manual therapy, neuromuscular re-education, patient education, strengthening, stretching, therapeutic exercise, therapeutic activities, balance/weight bearing training, gait training, abdominal trunk stabilization, self care, postural training, activity modification, ADL retraining, ADL training, balance, behavior modification, body mechanics training, breathing training, therapeutic training, transfer training, graded activity, graded motor, muscle pump exercises, [place] taping and IADL retraining    Frequency: 2x week  Duration in weeks: 6  Plan of Care beginning date: 11/14/2024  Plan of Care expiration date: 2/14/2025  Treatment plan discussed with: patient  Plan details: Patient has verbalized understanding and agreement with insurance verification form.       Subjective Evaluation    History of Present Illness  Mechanism of injury: Pt reports that she has had neck problems for most of her life. Pt states that she had previously used chiropractic. Pt states she has undergone more stress since August 2024. Pt states she has had muscle spasms that affect both sides of the neck. Pt states that due to neck pain, she is still able to get things done, but has pain after doing things. Pt reports pain with grocery shopping and has difficulty moving. Pt states that she has increased pain after household chores.   Patient Goals  Patient goals for therapy: increased motion  Patient goal: To have less tightness  Pain  Current pain rating:  2  At best pain ratin  At worst pain rating: 10  Location: Posterior neck to shoulders  Quality: dull ache and radiating (Numbness in both arms)  Relieving factors: heat (Exercises)  Aggravating factors: overhead activity and lifting    Social Support  Lives with: significant other    Employment status: not working  Hand dominance: right  Exercise history: 3-4x/week 20 minutes  Life stress: High - recently friends/loved ones passed away      Diagnostic Tests  No diagnostic tests performed  Treatments  Previous treatment: physical therapy, chiropractic and medication      Objective     Postural Observations  Seated posture: fair    Additional Postural Observation Details  Slight forward head, rounded shoulder posture    Palpation   Left   Hypertonic in the cervical paraspinals, levator scapulae and upper trapezius.   Tenderness of the cervical paraspinals and upper trapezius.     Right   Hypertonic in the cervical paraspinals, levator scapulae and upper trapezius.   Tenderness of the cervical paraspinals and upper trapezius.     Active Range of Motion   Cervical/Thoracic Spine       Cervical    Flexion: 50 degrees   Extension: 30 (Tightness) degrees      Left lateral flexion: 35 degrees      Right lateral flexion: 30 degrees     with pain  Left rotation: 70 degrees  Right rotation: 70 degrees       Thoracic    Extension:  Restriction level: maximal    Strength/Myotome Testing   Cervical Spine     Left   Interossei strength (t1): 5  Levator scapulae (C4): 5    Right   Interossei strength (t1): 5  Levator scapulae (C4): 5    Left Shoulder     Planes of Motion   Abduction: 4+     Isolated Muscles   Levator scapulae: 5     Right Shoulder     Planes of Motion   Abduction: 4     Isolated Muscles   Levator scapulae: 5     Left Elbow   Flexion: 5  Extension: 5    Right Elbow   Flexion: 5  Extension: 5    Left Wrist/Hand   Thumb extension: 5    Right Wrist/Hand   Thumb extension: 5    Additional Strength Details    strength  Right moderately limited compared to left with handshake (right hand dominant)             Precautions: Standard     EVAL 2 3 4 5 6   Manuals 11/14/24        STM/MFR         Manual flexibility         Joint mobs cervical                  Ther Ex         Cervical AROM         Seated thoracic extension Instructed        Cervical SNAG extension Instructed        Open book                                                               Neuro Re-Ed         Cervical retractions Instructed w/ self-OP        Scap retractions         Rows         Shoulder extensions                                                      Ther Activity         Pt education POC, HEP                                     Access Code: [email or web address]  URL: [email or web address]  Date: 11/14/2024  Prepared by: Madan Alegre    Exercises  - Seated Cervical Retraction  - 2 x daily - 7 x weekly - 2 sets - 10 reps - 3 hold  - Seated Thoracic Extension with Hands Supporting Neck  - 2 x daily - 7 x weekly - 2 sets - 10 reps - 5 hold  - Cervical Extension AROM with Strap  - 2 x daily - 7 x weekly - 2 sets - 10 reps - 3 hold

## 2024-11-20 ENCOUNTER — OFFICE VISIT (OUTPATIENT)
Dept: PHYSICAL THERAPY | Facility: CLINIC | Age: 69
End: 2024-11-20
Payer: COMMERCIAL

## 2024-11-20 DIAGNOSIS — M62.838 NECK MUSCLE SPASM: Primary | ICD-10-CM

## 2024-11-20 PROCEDURE — 97140 MANUAL THERAPY 1/> REGIONS: CPT

## 2024-11-20 PROCEDURE — 97112 NEUROMUSCULAR REEDUCATION: CPT

## 2024-11-20 NOTE — PROGRESS NOTES
Daily Note      Today's date: 2024  Patient name: Neema Leger  : 1955  MRN: [id number]  Referring provider: Kathi Vance DO  Dx:   Encounter Diagnosis     ICD-10-CM    1. Neck muscle spasm  M62.838           Subjective: Pt reports neck pain of 2/10.        Objective: See treatment diary below; pt arrived ~15 minutes late due to mixing up appointment time.     Assessment: Pt tolerated treatment well.  Pt demonstrated good form with initial exercises. Pt presents with severely limited thoracic extension. Introduced to cervical rotation MWM and pt presented with symmetrical restriction in each direction, with end range tightness noted. Pt presents with more severe tissue tension in the L UT compared to R. Pt introduced to scap retractions and rows and was instructed to add scap retractions to HEP. Confirmed next appt time with pt.     Plan: Continue per plan of care.  Progress periscapular strengthening.         Precautions: Standard     EVAL 2 3 4 5 6   Manuals 24       STM/MFR  B/L UT L > R, cervical paraspinals       Manual flexibility         Joint mobs cervical  MWM rotation 10x ea. side                Ther Ex         Cervical AROM         Seated thoracic extension Instructed 2x10 (3s) over 1/2 foam roll       Cervical SNAG extension Instructed 2x10 (3s)        Open book                                                               Neuro Re-Ed         Cervical retractions Instructed w/ self-OP 2x10 (3s)        Scap retractions  2x10 (5s)        Rows  2x10 GTB       Shoulder extensions                                                      Ther Activity         Pt education POC, HEP                                     Access Code: [email or web address]  URL: [email or web address]  Date: 2024  Prepared by: Madan Alegre    Exercises  - Seated Cervical Retraction  - 2 x daily - 7 x weekly - 2 sets - 10 reps - 3 hold  - Seated Thoracic Extension with Hands Supporting Neck  - 2  x daily - 7 x weekly - 2 sets - 10 reps - 5 hold  - Cervical Extension AROM with Strap  - 2 x daily - 7 x weekly - 2 sets - 10 reps - 3 hold

## 2024-11-22 ENCOUNTER — CONSULT (OUTPATIENT)
Age: 69
End: 2024-11-22
Payer: COMMERCIAL

## 2024-11-22 VITALS
HEART RATE: 59 BPM | DIASTOLIC BLOOD PRESSURE: 66 MMHG | OXYGEN SATURATION: 90 % | BODY MASS INDEX: 24.66 KG/M2 | SYSTOLIC BLOOD PRESSURE: 124 MMHG | WEIGHT: 134 LBS | HEIGHT: 62 IN

## 2024-11-22 DIAGNOSIS — M99.01 SEGMENTAL DYSFUNCTION OF CERVICAL REGION: Primary | ICD-10-CM

## 2024-11-22 DIAGNOSIS — M99.02 SEGMENTAL DYSFUNCTION OF THORACIC REGION: ICD-10-CM

## 2024-11-22 DIAGNOSIS — M47.812 CERVICAL SPONDYLOSIS: ICD-10-CM

## 2024-11-22 DIAGNOSIS — M62.838 NECK MUSCLE SPASM: ICD-10-CM

## 2024-11-22 DIAGNOSIS — M99.03 SEGMENTAL DYSFUNCTION OF LUMBAR REGION: ICD-10-CM

## 2024-11-22 PROCEDURE — 99202 OFFICE O/P NEW SF 15 MIN: CPT | Performed by: CHIROPRACTOR

## 2024-11-22 PROCEDURE — 98941 CHIROPRACT MANJ 3-4 REGIONS: CPT | Performed by: CHIROPRACTOR

## 2024-11-22 NOTE — PROGRESS NOTES
Diagnoses and all orders for this visit:    Segmental dysfunction of cervical region    Neck muscle spasm  -     Ambulatory Referral to Chiropractic    Segmental dysfunction of thoracic region    Segmental dysfunction of lumbar region    Cervical spondylosis     DATE OF FIRST VISIT: 11/22/24    ASSESSMENT:  No red flags, radiculopathy or neurologic deficit appreciated clinically. Pt's symptoms and exam findings consistent with spondylosis of cervical spine secondary to repetitive st/sp injury, exacerbated by postural/ergonomic stressors. Pt responded well to stretches and manual mobilization of the affected spinal and myofascial tissues with increased ROM; trial of conservative tx recommended consisting of stretching, ther-ex, graded mobilization/manipulation of the affected spinal/myofascial tissues, postural/ergonomic education and take home stretches/exercises. If symptoms fail to improve with short trial of conservative care, appropriate imaging and referral will be coordinated.  Spent greater than 29 min c pt discussing hx, pe, ddx, tx options and reviewing notes/imaging    PROCEDURE CODES: 41350-BD, 53788-10    TREATMENT:  Fear avoidance behavior discussion, encouraged and reassured pt that natural course of condition is to improve over time with adherence to tx plan and home care strategies. Home care recommendations: avoid bed rest, walk (but avoid trails and uneven surfaces), gradual return to activity to tolerance (avoid anything that peripheralizes symptoms), ice 20 min on/off, watch for ice burn, call if symptoms peripheralize, worsen, or neurologic deficit progresses. Ther-ex: IASTM - discussed post procedure soreness and/or ecchymosis for up to 36 hrs, applied to affected mm hypertonicities; wall kandi, axial retraction, upper trap stretch, lev scap stretch, SCM stretch, lat rows with t-band, lat pull down with t-band, abdominal bracing; greater than 15 min spent performing above mentioned ther-ex.  Thoracic mobilization/manipulation: prone P-A mob, supine A-P manip; cervical mobilization/manipulation: diversified supine graded mobilization, cervical traction, prone C/T jct HVLA    HPI:  Neema Leger is a 68 y.o. female   Chief Complaint   Patient presents with    Neck Pain     Neck pain-10    Shoulder Pain     Shoulder pain-10  Both shoulder pain    Back Pain     Middle back pain-10     The patient presents to the office with increase pain in the mid back and into the neck that started on Labor day without traumatic event. The patient knows it has to do with stress which was high in June 2024. The patient lost several dear friends since then. Stress is lessening now. The patient has dull pain into the arms that also shoots R thumb. Weakness is getting worse. She started PT recently and its going well. The patient went to Kathi Vance DO her PCP and the patient was referred here for consult. Pain can at times wake her up but recently its been better. Valerian root helps her sleep. Pain is moderate today but at times when they spasm can be a 10/10. No HA.    Neck Pain   Pertinent negatives include no chest pain, fever, headaches, numbness or weakness.   Shoulder Pain   Pertinent negatives include no fever or numbness.   Back Pain  Pertinent negatives include no chest pain, fever, headaches, numbness or weakness.     Past Medical History:   Diagnosis Date    Seasonal allergies     Vertigo       The following portions of the patient's history were reviewed and updated as appropriate: allergies, past family history, past medical history, past social history, past surgical history, and problem list.  Review of Systems   Constitutional:  Negative for activity change, fatigue, fever and unexpected weight change.   HENT:  Negative for ear pain, hearing loss, sinus pressure, sinus pain, sore throat and tinnitus.    Respiratory:  Negative for chest tightness, shortness of breath, wheezing and stridor.    Cardiovascular:   Negative for chest pain.   Genitourinary:  Negative for flank pain and frequency.   Musculoskeletal:  Positive for arthralgias, back pain, myalgias, neck pain and neck stiffness. Negative for joint swelling.   Skin:  Negative for color change and pallor.   Neurological:  Negative for dizziness, speech difficulty, weakness, numbness and headaches.   Psychiatric/Behavioral:  Negative for agitation and sleep disturbance. The patient is not nervous/anxious.      Physical Exam  Constitutional:       General: She is not in acute distress.     Appearance: Normal appearance.   HENT:      Head: Normocephalic.      Mouth/Throat:      Mouth: Mucous membranes are moist.   Eyes:      Extraocular Movements: Extraocular movements intact.      Conjunctiva/sclera: Conjunctivae normal.      Pupils: Pupils are equal, round, and reactive to light.   Neck:      Vascular: No carotid bruit.   Pulmonary:      Effort: Pulmonary effort is normal.   Chest:      Chest wall: No tenderness.   Abdominal:      General: Abdomen is flat.      Palpations: Abdomen is soft.   Musculoskeletal:         General: Tenderness present. No swelling, deformity or signs of injury. Normal range of motion.        Arms:       Cervical back: Normal range of motion. Rigidity and tenderness present.      Right lower leg: No edema.      Left lower leg: No edema.   Lymphadenopathy:      Cervical: No cervical adenopathy.   Skin:     General: Skin is warm.      Coloration: Skin is not jaundiced or pale.      Findings: No bruising or erythema.   Neurological:      Mental Status: She is alert and oriented to person, place, and time.      Cranial Nerves: No cranial nerve deficit.      Sensory: No sensory deficit.      Motor: No weakness.      Gait: Gait is intact.      Deep Tendon Reflexes: Reflexes are normal and symmetric.   Psychiatric:         Attention and Perception: Attention normal.         Mood and Affect: Mood and affect normal.         Speech: Speech normal.          Behavior: Behavior normal. Behavior is cooperative.         Thought Content: Thought content normal.         Cognition and Memory: Cognition normal.         Judgment: Judgment normal.     SOFT TISSUE ASSESSMENT: Hypertonicity and tenderness palpated C5-L1 erector spinae, upper/middle traps, lev scap, SCM, scalene, rhomboid, suboccipitals JOINT RECTRICTIONS: C5-T6, T10-L1 ORTHO: Tatyana unremarkable for centralization/peripheralization; max foraminal comp elicits local np R/L; shoulder depression elicits stiffness in R/L upper trap; brachial plexus tension test elicits neural tension in R/L UE; cervical distraction elicits relieves CC    Return in about 1 week (around 11/29/2024) for Recheck.

## 2024-11-29 ENCOUNTER — OFFICE VISIT (OUTPATIENT)
Dept: PHYSICAL THERAPY | Facility: CLINIC | Age: 69
End: 2024-11-29
Payer: COMMERCIAL

## 2024-11-29 DIAGNOSIS — M62.838 NECK MUSCLE SPASM: Primary | ICD-10-CM

## 2024-11-29 PROCEDURE — 97110 THERAPEUTIC EXERCISES: CPT

## 2024-11-29 PROCEDURE — 97112 NEUROMUSCULAR REEDUCATION: CPT

## 2024-11-29 PROCEDURE — 97140 MANUAL THERAPY 1/> REGIONS: CPT

## 2024-11-29 NOTE — PROGRESS NOTES
Daily Note      Today's date: 2024  Patient name: Neema Leger  : 1955  MRN: [id number]  Referring provider: Kathi Vance DO  Dx:   Encounter Diagnosis     ICD-10-CM    1. Neck muscle spasm  M62.838           Subjective: Pt reports that she is doing well overall. Pt states that she went to her chiropractic consult, which she felt was helpful. Pt states that lately, she has had some pain, but decreased muscle spasm. Pt states that she did not do as much driving over the past week.        Objective: See treatment diary below    Assessment: Pt tolerated treatment well.  Pt introduced to open books in sidelying and demonstrated good form. Pt introduced to shoulder extensions and demonstrated good form with verbal and tactile cuing. Pt notes increased radicular symptoms in the left hand during sleep. Plan to assess nerve tension testing next visit and consider adding nerve mobility to POC.     Plan: Continue per plan of care.         POC Expires Reeval for Medicare to be completed Unit Limit Auth Status Auth Expiration Date PT/OT/STVisit Limit FOTO   25 By visit 8 6 units per date of service Required after 8th visit 24      Completed on visit                       Precautions: Standard     EVAL 2 3 4 5 6   Manuals 24      STM/MFR  B/L UT L > R, cervical paraspinals B/L UT L > R, cervical paraspinals      Manual flexibility         Joint mobs cervical  MWM rotation 10x ea. side LAD                Ther Ex         Cervical AROM         Seated thoracic extension Instructed 2x10 (3s) over 1/2 foam roll 2x10 (5s) over 1/2 foam roll       Cervical SNAG extension Instructed 2x10 (3s)        Open book   10x5s B/L       Nerve glides   -->                                                   Neuro Re-Ed         Cervical retractions Instructed w/ self-OP 2x10 (3s)  2x10 (3s)      Chin tucks   Supine 2x10 (3s)       Scap retractions  2x10 (5s)        Rows  2x10 GTB 2x10 GTB       Shoulder extensions   2x10 orange TB                                                   Ther Activity         Pt education POC, HEP                                     Access Code: [email or web address]  URL: [email or web address]  Date: 11/14/2024  Prepared by: Madan Alegre    Exercises  - Seated Cervical Retraction  - 2 x daily - 7 x weekly - 2 sets - 10 reps - 3 hold  - Seated Thoracic Extension with Hands Supporting Neck  - 2 x daily - 7 x weekly - 2 sets - 10 reps - 5 hold  - Cervical Extension AROM with Strap  - 2 x daily - 7 x weekly - 2 sets - 10 reps - 3 hold

## 2024-12-02 ENCOUNTER — VBI (OUTPATIENT)
Dept: ADMINISTRATIVE | Facility: OTHER | Age: 69
End: 2024-12-02

## 2024-12-02 NOTE — TELEPHONE ENCOUNTER
12/02/24 7:32 AM     Chart reviewed for CRC: Colonoscopy ; nothing is submitted to the patient's insurance at this time.     Rissa Irwin MA   [facility]

## 2024-12-03 ENCOUNTER — OFFICE VISIT (OUTPATIENT)
Dept: PHYSICAL THERAPY | Facility: CLINIC | Age: 69
End: 2024-12-03
Payer: COMMERCIAL

## 2024-12-03 DIAGNOSIS — M62.838 NECK MUSCLE SPASM: Primary | ICD-10-CM

## 2024-12-03 PROCEDURE — 97110 THERAPEUTIC EXERCISES: CPT

## 2024-12-03 PROCEDURE — 97140 MANUAL THERAPY 1/> REGIONS: CPT

## 2024-12-03 NOTE — PROGRESS NOTES
Daily Note      Today's date: 12/3/2024  Patient name: Neema Leger  : 1955  MRN: [id number]  Referring provider: Kathi Vance DO  Dx:   Encounter Diagnosis     ICD-10-CM    1. Neck muscle spasm  M62.838           Subjective: Pt reports that she is experiencing relief from her exercises. Pt states that neck muscle spasm occurs less often, but does feel soreness in the left upper trap. Pt states that she feels she is doing better.        Objective: See treatment diary below    ULTT  Median: (+) bilaterally, more severe on right with restricted elbow extension  Radial: (-) left, (+) right  Ulnar: (+) bilaterally, more severe on right    Assessment: Pt tolerated treatment well. Performed ULTT and pt presented with (+) testing through median, radial, and ulnar on the RUE, with the most severe symptoms/restriction with median. Pt presented with left ulnar and median nerve tension, but less compared to the right side. Pt introduced to self-nerve glides and noted fatigue toward the end of the visit and also noted difficulty sidebending to the right at the end of the visit. Plan to review nerve glides next visit for HEP.     Plan: Continue per plan of care.           POC Expires Reeval for Medicare to be completed Unit Limit Auth Status Auth Expiration Date PT/OT/STVisit Limit FOTO   25 By visit N/A 6 units per date of service N/A until visit 8  24 8 visits      Completed on visit N/A                    TREATMENT DIARY  Auth Status         Approved               Visit # 1 2 3 4     Visits Remaining 7 6 5 4               Precautions: Standard     EVAL 2 3 4 5 6   Manuals 11/14/24 11/20/24 11/29/24 12/3/24     STM/MFR  B/L UT L > R, cervical paraspinals B/L UT L > R, cervical paraspinals B/L UT L > R, cervical paraspinals     Manual flexibility         Joint mobs cervical  MWM rotation 10x ea. side LAD  Cervical distraction w/ SOR              Ther Ex         Cervical AROM         Seated thoracic  extension Instructed 2x10 (3s) over 1/2 foam roll 2x10 (5s) over 1/2 foam roll  2x10 (5s) over 1/2 FR     Cervical SNAG extension Instructed 2x10 (3s)        Open book   10x5s B/L  10x5s B/L      Nerve glides   --> 20x B/L median                                                   Neuro Re-Ed         Cervical retractions Instructed w/ self-OP 2x10 (3s)  2x10 (3s) 2x10 (3s)      Chin tucks   Supine 2x10 (3s)  Supine 2x10 (3s)      Scap retractions  2x10 (5s)        Rows  2x10 GTB 2x10 GTB      Shoulder extensions   2x10 orange TB                                                   Ther Activity         Pt education POC, HEP                                     Access Code: [email or web address]  URL: [email or web address]  Date: 11/14/2024  Prepared by: Madan Alegre    Exercises  - Seated Cervical Retraction  - 2 x daily - 7 x weekly - 2 sets - 10 reps - 3 hold  - Seated Thoracic Extension with Hands Supporting Neck  - 2 x daily - 7 x weekly - 2 sets - 10 reps - 5 hold  - Cervical Extension AROM with Strap  - 2 x daily - 7 x weekly - 2 sets - 10 reps - 3 hold

## 2024-12-10 ENCOUNTER — OFFICE VISIT (OUTPATIENT)
Dept: PHYSICAL THERAPY | Facility: CLINIC | Age: 69
End: 2024-12-10
Payer: COMMERCIAL

## 2024-12-10 DIAGNOSIS — M62.838 NECK MUSCLE SPASM: Primary | ICD-10-CM

## 2024-12-10 PROCEDURE — 97110 THERAPEUTIC EXERCISES: CPT

## 2024-12-10 PROCEDURE — 97112 NEUROMUSCULAR REEDUCATION: CPT

## 2024-12-10 PROCEDURE — 97140 MANUAL THERAPY 1/> REGIONS: CPT

## 2024-12-10 NOTE — PROGRESS NOTES
Daily Note      Today's date: 12/10/2024  Patient name: Neema Leger  : 1955  MRN: [id number]  Referring provider: Kathi Vance DO  Dx:   Encounter Diagnosis     ICD-10-CM    1. Neck muscle spasm  M62.838           Subjective: Pt reports that she had increased neck and arm pain over the past two days, which she attributes to doing a lot of household chores.        Objective: See treatment diary below    ULTT  Radial = (-) on right, (+) on left  Median = (+) bilaterally, more irritable on left    Assessment: Pt tolerated treatment well.  Pt presented with more neural tension on the left arm compared to right with radial and median nerve glides.  Pt noted improvement of symptoms with thoracic extension while seated. Pt introduced to B/L ER and noted fatigue, as this was performed at the end of the session.     Plan: Continue per plan of care.  Progress treatment as tolerated.            POC Expires Reeval for Medicare to be completed Unit Limit Auth Status Auth Expiration Date PT/OT/STVisit Limit FOTO   25 By visit N/A 6 units Authorized 24 8 visits      Completed on visit N/A                    Precautions: Standard    Auth Status         Authorized  Visit # 1 2 3 4 5    Visits Remaining 7 6 5 4 3             Manuals 11/14/24 11/20/24 11/29/24 12/3/24 12/10/24    STM/MFR  B/L UT L > R, cervical paraspinals B/L UT L > R, cervical paraspinals B/L UT L > R, cervical paraspinals B/L UT L > R, cervical paraspinals    Manual flexibility     Nerve mobility radial, median BUE    Joint mobs cervical  MWM rotation 10x ea. side LAD  Cervical distraction w/ SOR Cervical distraction w/ SOR             Ther Ex         Cervical AROM         Seated thoracic extension Instructed 2x10 (3s) over 1/2 foam roll 2x10 (5s) over 1/2 foam roll  2x10 (5s) over 1/2 FR 2x10 (5s) over 1/2 FR     Cervical SNAG extension Instructed 2x10 (3s)        Open book   10x5s B/L  10x5s B/L  10x5s B/L     Nerve glides   --> 20x  B/L median                                                   Neuro Re-Ed         Cervical retractions Instructed w/ self-OP 2x10 (3s)  2x10 (3s) 2x10 (3s)  10x3s    Chin tucks   Supine 2x10 (3s)  Supine 2x10 (3s)  Supine 2x10 (3s)     Scap retractions  2x10 (5s)    2x10 (5s)     Rows  2x10 GTB 2x10 GTB  2x10 orange TB    Shoulder extensions   2x10 orange TB  2x10 orange TB    B/L ER      10x orange [employer] reps                                       Ther Activity         Pt education POC, HEP                                     Access Code: [email or web address]  URL: [email or web address]  Date: 11/14/2024  Prepared by: Madan Alegre    Exercises  - Seated Cervical Retraction  - 2 x daily - 7 x weekly - 2 sets - 10 reps - 3 hold  - Seated Thoracic Extension with Hands Supporting Neck  - 2 x daily - 7 x weekly - 2 sets - 10 reps - 5 hold  - Cervical Extension AROM with Strap  - 2 x daily - 7 x weekly - 2 sets - 10 reps - 3 hold

## 2024-12-16 ENCOUNTER — PROCEDURE VISIT (OUTPATIENT)
Age: 69
End: 2024-12-16
Payer: COMMERCIAL

## 2024-12-16 VITALS
HEIGHT: 62 IN | SYSTOLIC BLOOD PRESSURE: 124 MMHG | DIASTOLIC BLOOD PRESSURE: 66 MMHG | BODY MASS INDEX: 24.66 KG/M2 | WEIGHT: 134 LBS | HEART RATE: 60 BPM

## 2024-12-16 DIAGNOSIS — M99.01 SEGMENTAL DYSFUNCTION OF CERVICAL REGION: ICD-10-CM

## 2024-12-16 DIAGNOSIS — M47.812 CERVICAL SPONDYLOSIS: ICD-10-CM

## 2024-12-16 DIAGNOSIS — M62.838 NECK MUSCLE SPASM: Primary | ICD-10-CM

## 2024-12-16 DIAGNOSIS — M99.03 SEGMENTAL DYSFUNCTION OF LUMBAR REGION: ICD-10-CM

## 2024-12-16 DIAGNOSIS — M99.02 SEGMENTAL DYSFUNCTION OF THORACIC REGION: ICD-10-CM

## 2024-12-16 PROCEDURE — 98941 CHIROPRACT MANJ 3-4 REGIONS: CPT | Performed by: CHIROPRACTOR

## 2024-12-16 NOTE — PROGRESS NOTES
Diagnoses and all orders for this visit:    Neck muscle spasm    Segmental dysfunction of cervical region    Segmental dysfunction of thoracic region    Segmental dysfunction of lumbar region    Cervical spondylosis     DATE OF FIRST VISIT: 11/22/24    ASSESSMENT:   The symptoms and exam findings consistent with spondylosis of cervical spine secondary to repetitive st/sp injury, exacerbated by postural/ergonomic stressors. Pt responded well to stretches and manual mobilization of the affected spinal and myofascial tissues with increased ROM; trial of conservative tx recommended consisting of stretching, ther-ex, graded mobilization/manipulation of the affected spinal/myofascial tissues, postural/ergonomic education and take home stretches/exercises. If symptoms fail to improve with short trial of conservative care, appropriate imaging and referral will be coordinated.  - Tolerated treatment well with decrease in tightness and pain.     PROCEDURE CODES: 21861-UT    TREATMENT:  Fear avoidance behavior discussion, encouraged and reassured pt that natural course of condition is to improve over time with adherence to tx plan and home care strategies. Home care recommendations: avoid bed rest, walk (but avoid trails and uneven surfaces), gradual return to activity to tolerance (avoid anything that peripheralizes symptoms), ice 20 min on/off, watch for ice burn, call if symptoms peripheralize, worsen, or neurologic deficit progresses. Ther-ex: IASTM - discussed post procedure soreness and/or ecchymosis for up to 36 hrs, applied to affected mm hypertonicities; wall kandi, axial retraction, upper trap stretch, lev scap stretch, SCM stretch, lat rows with t-band, lat pull down with t-band, abdominal bracing; greater than 15 min spent performing above mentioned ther-ex. Thoracic mobilization/manipulation: prone P-A mob; cervical mobilization/manipulation: diversified supine graded mobilization, cervical traction, prone C/T jct  HVLA    HPI:  Neema Leger is a 69 y.o. female   Chief Complaint   Patient presents with   • Neck Pain     Neck pain-6   • Shoulder Pain     Shoulder pain-6  Both shoulders    • Back Pain     Middle back pain-6     The patient presents to the office with increase pain in the mid back and into the neck that started on Labor day without traumatic event. The patient knows it has to do with stress which was high in June 2024. The patient lost several dear friends since then. Stress is lessening now. The patient has dull pain into the arms that also shoots R thumb. Weakness is getting worse. She started PT recently and its going well. The patient went to Kathi Vacne DO her PCP and the patient was referred here for consult. Pain can at times wake her up but recently its been better. Valerian root helps her sleep. Pain is moderate today but at times when they spasm can be a 10/10. No HA.  12/16- The patient reports more stiff and achiness today more than pain 6/10. But the weekend before she had a lot of spasms do to the physical activities    Neck Pain     Shoulder Pain     Back Pain      Past Medical History:   Diagnosis Date   • Seasonal allergies    • Vertigo       The following portions of the patient's history were reviewed and updated as appropriate: allergies, past family history, past medical history, past social history, past surgical history, and problem list.  Review of Systems   Musculoskeletal:  Positive for arthralgias, back pain, myalgias, neck pain and neck stiffness.     Physical Exam  Constitutional:       Appearance: Normal appearance.   Musculoskeletal:         General: Tenderness present. Normal range of motion.        Arms:       Cervical back: Normal range of motion. Rigidity and tenderness present.   Neurological:      Mental Status: She is alert and oriented to person, place, and time.      Gait: Gait is intact.      Deep Tendon Reflexes: Reflexes are normal and symmetric.   Psychiatric:          Attention and Perception: Attention normal.         Mood and Affect: Affect normal.         Speech: Speech normal.         Behavior: Behavior is cooperative.         Cognition and Memory: Cognition normal.       SOFT TISSUE ASSESSMENT: Hypertonicity and tenderness palpated C5-L1 erector spinae, upper/middle traps, lev scap, SCM, scalene, rhomboid, suboccipitals JOINT RECTRICTIONS: C5-T6, T10-L1 ORTHO: Tatyana unremarkable for centralization/peripheralization; max foraminal comp elicits local np R/L; shoulder depression elicits stiffness in R/L upper trap; brachial plexus tension test elicits neural tension in R/L UE; cervical distraction elicits relieves CC    Return in about 1 week (around 12/23/2024) for Recheck.

## 2024-12-17 ENCOUNTER — OFFICE VISIT (OUTPATIENT)
Dept: PHYSICAL THERAPY | Facility: CLINIC | Age: 69
End: 2024-12-17
Payer: COMMERCIAL

## 2024-12-17 DIAGNOSIS — M62.838 NECK MUSCLE SPASM: Primary | ICD-10-CM

## 2024-12-17 PROCEDURE — 97112 NEUROMUSCULAR REEDUCATION: CPT

## 2024-12-17 PROCEDURE — 97140 MANUAL THERAPY 1/> REGIONS: CPT

## 2024-12-17 PROCEDURE — 97110 THERAPEUTIC EXERCISES: CPT

## 2024-12-17 NOTE — PROGRESS NOTES
"Daily Note      Today's date: 2024  Patient name: Neema Leger  : 1955  MRN: [id number]  Referring provider: Kathi Vance DO  Dx:   Encounter Diagnosis     ICD-10-CM    1. Neck muscle spasm  M62.838           Subjective: Pt reports that her neck feels fine today, but had increased pain and muscle spasm yesterday. Pt states that she had been doing work around the house and raising her arms overhead increases her neck pain.        Objective: See treatment diary below    Assessment: Pt tolerated treatment well.  Pt continues to note improved symptoms and improved motion following manual therapy and cervical retraction and thoracic extension. Pt introduced to towel slides with isometric ER on the wall and noted improved shoulder mobility following this. Pt reported that she sometimes gets \"woozy\" during certain exercises, such as towel slides. Pt reports history of vertigo. Advised pt to log/keep track of what motions exacerbate her symptoms and educated pt regarding vestibular rehab.     Plan: Continue per plan of care.           POC Expires Reeval for Medicare to be completed Unit Limit Auth Status Auth Expiration Date PT/OT/STVisit Limit FOTO   25 By visit N/A 6 units Authorized 24 8 visits      Completed on visit N/A                    Precautions: Standard    Auth Status         Authorized  Visit # 1 2 3 4 5 6   Visits Remaining 7 6 5 4 3 2             Manuals 11/14/24 11/20/24 11/29/24 12/3/24 12/10/24 12/17/24   STM/MFR  B/L UT L > R, cervical paraspinals B/L UT L > R, cervical paraspinals B/L UT L > R, cervical paraspinals B/L UT L > R, cervical paraspinals B/L UT L > R, cervical paraspinals   Manual flexibility     Nerve mobility radial, median BUE    Joint mobs cervical  MWM rotation 10x ea. side LAD  Cervical distraction w/ SOR Cervical distraction w/ SOR Cervical distraction w/ SOR            Ther Ex         Cervical AROM         Seated thoracic extension Instructed 2x10 (3s) " over 1/2 foam roll 2x10 (5s) over 1/2 foam roll  2x10 (5s) over 1/2 FR 2x10 (5s) over 1/2 FR  2x10 (5s) over 1/2 FR    Cervical SNAG extension Instructed 2x10 (3s)        Open book   10x5s B/L  10x5s B/L  10x5s B/L  10x5s B/L    Nerve glides   --> 20x B/L median                                                   Neuro Re-Ed         Cervical retractions Instructed w/ self-OP 2x10 (3s)  2x10 (3s) 2x10 (3s)  10x3s 10x seated   Chin tucks   Supine 2x10 (3s)  Supine 2x10 (3s)  Supine 2x10 (3s)  Supine 2x10 (3s)    Scap retractions  2x10 (5s)    2x10 (5s)  2x10 (5s)    Rows  2x10 GTB 2x10 GTB  2x10 orange TB    Shoulder extensions   2x10 orange TB  2x10 orange TB    B/L ER      10x orange TB    Towel slides w/ ER iso      10x5s                               Ther Activity         Pt education POC, HEP                                     Access Code: [email or web address]  URL: [email or web address]  Date: 11/14/2024  Prepared by: Madan Alegre    Exercises  - Seated Cervical Retraction  - 2 x daily - 7 x weekly - 2 sets - 10 reps - 3 hold  - Seated Thoracic Extension with Hands Supporting Neck  - 2 x daily - 7 x weekly - 2 sets - 10 reps - 5 hold  - Cervical Extension AROM with Strap  - 2 x daily - 7 x weekly - 2 sets - 10 reps - 3 hold

## 2024-12-23 ENCOUNTER — PROCEDURE VISIT (OUTPATIENT)
Age: 69
End: 2024-12-23
Payer: COMMERCIAL

## 2024-12-23 VITALS — HEIGHT: 62 IN | WEIGHT: 134 LBS | BODY MASS INDEX: 24.66 KG/M2

## 2024-12-23 DIAGNOSIS — M99.03 SEGMENTAL DYSFUNCTION OF LUMBAR REGION: ICD-10-CM

## 2024-12-23 DIAGNOSIS — M47.812 CERVICAL SPONDYLOSIS: ICD-10-CM

## 2024-12-23 DIAGNOSIS — M99.02 SEGMENTAL DYSFUNCTION OF THORACIC REGION: ICD-10-CM

## 2024-12-23 DIAGNOSIS — M62.838 NECK MUSCLE SPASM: Primary | ICD-10-CM

## 2024-12-23 DIAGNOSIS — M99.01 SEGMENTAL DYSFUNCTION OF CERVICAL REGION: ICD-10-CM

## 2024-12-23 PROCEDURE — 98941 CHIROPRACT MANJ 3-4 REGIONS: CPT | Performed by: CHIROPRACTOR

## 2024-12-23 NOTE — PROGRESS NOTES
Diagnoses and all orders for this visit:    Neck muscle spasm    Segmental dysfunction of cervical region    Segmental dysfunction of thoracic region    Segmental dysfunction of lumbar region    Cervical spondylosis     DATE OF FIRST VISIT: 11/22/24    ASSESSMENT:   The symptoms and exam findings consistent with spondylosis of cervical spine secondary to repetitive st/sp injury, exacerbated by postural/ergonomic stressors. Pt responded well to stretches and manual mobilization of the affected spinal and myofascial tissues with increased ROM; trial of conservative tx recommended consisting of stretching, ther-ex, graded mobilization/manipulation of the affected spinal/myofascial tissues, postural/ergonomic education and take home stretches/exercises. If symptoms fail to improve with short trial of conservative care, appropriate imaging and referral will be coordinated.  - Tolerated treatment well with decrease in tightness and pain.     PROCEDURE CODES: 57436-JC    TREATMENT:  Fear avoidance behavior discussion, encouraged and reassured pt that natural course of condition is to improve over time with adherence to tx plan and home care strategies. Home care recommendations: avoid bed rest, walk (but avoid trails and uneven surfaces), gradual return to activity to tolerance (avoid anything that peripheralizes symptoms), ice 20 min on/off, watch for ice burn, call if symptoms peripheralize, worsen, or neurologic deficit progresses. Ther-ex: IASTM - discussed post procedure soreness and/or ecchymosis for up to 36 hrs, applied to affected mm hypertonicities; wall kandi, axial retraction, upper trap stretch, lev scap stretch, SCM stretch, lat rows with t-band, lat pull down with t-band, abdominal bracing; greater than 15 min spent performing above mentioned ther-ex. Thoracic mobilization/manipulation: prone P-A mob; cervical mobilization/manipulation: diversified supine graded mobilization, cervical traction, prone C/T jct  HVLA    HPI:  Neema Leger is a 69 y.o. female   Chief Complaint   Patient presents with   • Neck Pain     Neck pain-5   • Shoulder Pain     Shoulder pain-5  Both shoulders    • Back Pain     Middle back pain-5     The patient presents to the office with increase pain in the mid back and into the neck that started on Labor day without traumatic event. The patient knows it has to do with stress which was high in June 2024. The patient lost several dear friends since then. Stress is lessening now. The patient has dull pain into the arms that also shoots R thumb. Weakness is getting worse. She started PT recently and its going well. The patient went to Kathi Vance DO her PCP and the patient was referred here for consult. Pain can at times wake her up but recently its been better. Valerian root helps her sleep. Pain is moderate today but at times when they spasm can be a 10/10. No HA.  12/16- The patient reports more stiff and achiness today more than pain 6/10. But the weekend before she had a lot of spasms do to the physical activities  12/23- Thd patient is achy 5/10 in the neck and mid back and lower back today.    Neck Pain     Shoulder Pain     Back Pain    Past Medical History:   Diagnosis Date   • Seasonal allergies    • Vertigo       The following portions of the patient's history were reviewed and updated as appropriate: allergies, past family history, past medical history, past social history, past surgical history, and problem list.  Review of Systems   Musculoskeletal:  Positive for arthralgias, back pain, myalgias, neck pain and neck stiffness.     Physical Exam  Constitutional:       Appearance: Normal appearance.   Musculoskeletal:         General: Tenderness present. Normal range of motion.        Arms:       Cervical back: Normal range of motion. Rigidity and tenderness present.   Neurological:      Mental Status: She is alert and oriented to person, place, and time.      Gait: Gait is intact.      Deep  Tendon Reflexes: Reflexes are normal and symmetric.   Psychiatric:         Attention and Perception: Attention normal.         Mood and Affect: Affect normal.         Speech: Speech normal.         Behavior: Behavior is cooperative.         Cognition and Memory: Cognition normal.     SOFT TISSUE ASSESSMENT: Hypertonicity and tenderness palpated C5-L1 erector spinae, upper/middle traps, lev scap, SCM, scalene, rhomboid, suboccipitals JOINT RECTRICTIONS: C5-T6, T10-L1 ORTHO: Tatyana unremarkable for centralization/peripheralization; max foraminal comp elicits local np R/L; shoulder depression elicits stiffness in R/L upper trap; brachial plexus tension test elicits neural tension in R/L UE; cervical distraction elicits relieves CC    Return in about 1 week (around 12/30/2024) for Recheck.

## 2024-12-24 ENCOUNTER — OFFICE VISIT (OUTPATIENT)
Dept: PHYSICAL THERAPY | Facility: CLINIC | Age: 69
End: 2024-12-24
Payer: COMMERCIAL

## 2024-12-24 DIAGNOSIS — M62.838 NECK MUSCLE SPASM: Primary | ICD-10-CM

## 2024-12-24 PROCEDURE — 97110 THERAPEUTIC EXERCISES: CPT

## 2024-12-24 PROCEDURE — 97140 MANUAL THERAPY 1/> REGIONS: CPT

## 2024-12-24 PROCEDURE — 97112 NEUROMUSCULAR REEDUCATION: CPT

## 2024-12-24 NOTE — PROGRESS NOTES
"Daily Note      Today's date: 2024  Patient name: Neema Leger  : 1955  MRN: [id number]  Referring provider: Kathi Vance DO  Dx:   Encounter Diagnosis     ICD-10-CM    1. Neck muscle spasm  M62.838           Subjective: Pt reports that she had a bad week. Pt states that on Saturday, she went out and it was very cold, but was not sure if this affected it. Pt states that she was sore after last visit and had trouble sleeping. Pt states that she is now able to massage her own neck muscles. Pt states she was not able to do this previously because her muscles were \"like a rock.\"        Objective: See treatment diary below    Assessment: Pt tolerated treatment well.  Held off from higher level periscapular activation exercises (wall slide with ER) due to persisting discomfort after last session.  Pt presents with overall improved muscle tension in the cervical paraspinals and UT compared to previous visits. Pt resumed with theraband rows and shoulder extensions and noted fatigue at the end.     Plan: Continue per plan of care.           POC Expires Reeval for Medicare to be completed Unit Limit Auth Status Auth Expiration Date PT/OT/STVisit Limit FOTO   25 By visit N/A 6 units Authorized 24 8 visits      Completed on visit N/A                    Precautions: Standard    Auth Status         Authorized  Visit # 3 4 5 6 7 8 (RE-EVAL)   Visits Remaining 5 4 3 2  1 0            Manuals 11/29/24 12/3/24 12/10/24 12/17/24 12/24/24    STM/MFR B/L UT L > R, cervical paraspinals B/L UT L > R, cervical paraspinals B/L UT L > R, cervical paraspinals B/L UT L > R, cervical paraspinals B/L UT L > R, cervical paraspinals    Manual flexibility   Nerve mobility radial, median BUE      Joint mobs cervical LAD  Cervical distraction w/ SOR Cervical distraction w/ SOR Cervical distraction w/ SOR Cervical distraction w/ SOR             Ther Ex         Cervical AROM         Seated thoracic extension 2x10 (5s) " over 1/2 foam roll  2x10 (5s) over 1/2 FR 2x10 (5s) over 1/2 FR  2x10 (5s) over 1/2 FR  2x10 (5s) over 1/2 FR    Cervical SNAG extension         Open book 10x5s B/L  10x5s B/L  10x5s B/L  10x5s B/L  10x5s B/L     Nerve glides --> 20x B/L median                                                     Neuro Re-Ed         Cervical retractions 2x10 (3s) 2x10 (3s)  10x3s 10x seated 10x seated    Chin tucks Supine 2x10 (3s)  Supine 2x10 (3s)  Supine 2x10 (3s)  Supine 2x10 (3s)  Supine 2x10 (3s)     Scap retractions   2x10 (5s)  2x10 (5s)  2x10 (5s)     Rows 2x10 GTB  2x10 orange TB  2x10 orange TB    Shoulder extensions 2x10 orange TB  2x10 orange TB  2x10 orange TB    B/L ER    10x orange TB      Towel slides w/ ER iso    10x5s  Held                               Ther Activity         Pt education                                      Access Code: [email or web address]  URL: [email or web address]  Date: 11/14/2024  Prepared by: Madan Alegre    Exercises  - Seated Cervical Retraction  - 2 x daily - 7 x weekly - 2 sets - 10 reps - 3 hold  - Seated Thoracic Extension with Hands Supporting Neck  - 2 x daily - 7 x weekly - 2 sets - 10 reps - 5 hold  - Cervical Extension AROM with Strap  - 2 x daily - 7 x weekly - 2 sets - 10 reps - 3 hold

## 2024-12-31 ENCOUNTER — EVALUATION (OUTPATIENT)
Dept: PHYSICAL THERAPY | Facility: CLINIC | Age: 69
End: 2024-12-31
Payer: COMMERCIAL

## 2024-12-31 DIAGNOSIS — M62.838 NECK MUSCLE SPASM: Primary | ICD-10-CM

## 2024-12-31 PROCEDURE — 97140 MANUAL THERAPY 1/> REGIONS: CPT

## 2024-12-31 PROCEDURE — 97112 NEUROMUSCULAR REEDUCATION: CPT

## 2024-12-31 PROCEDURE — 97110 THERAPEUTIC EXERCISES: CPT

## 2024-12-31 NOTE — PROGRESS NOTES
PT Re-Evaluation     Today's date: 2024  Patient name: Neema Leger  : 1955  MRN: [id number]  Referring provider: Kathi Vance DO  Dx:   Encounter Diagnosis     ICD-10-CM    1. Neck muscle spasm  M62.838                      Assessment  Impairments: abnormal muscle firing, abnormal or restricted ROM, activity intolerance, impaired physical strength, lacks appropriate home exercise program, pain with function, scapular dyskinesis, poor posture  and poor body mechanics    Assessment details: Neema Leger has been seen in hospital-based outpatient PT for 8 visits for the referring diagnosis of neck muscle spasm. Patient demonstrates gradual progress toward short-term and long-term PT goals. Patient presents with the following improvements noted: decreased neck pain, improved cervical/thoracic ROM, improved UE strength, and improved knowledge of HEP. Patient continues to present with the following impairments: neck pain, radicular symptoms to either hand, limited cervical/thoracic AROM, and postural dysfunction. Due to these impairments, patient continues to have difficulty performing the following: reaching overhead, lifting/carrying, pushing/pulling, household chores, self-care activities, and shopping. Patient has been educated in home exercise program and plan of care. Patient would benefit from continued skilled physical therapy services to address the above functional limitations and to further progress towards prior level of function and independence with home exercise program.  Understanding of Dx/Px/POC: good     Prognosis: good    Goals  Short Term Goals [3 weeks; target date: 12/15/24]  1. Patient will be independent with initial HEP. - goal met  2. Patient will demonstrate an increase in cervical AROM by 5 degrees in all planes. - in progress  3. Patient will present with moderate restriction in thoracic extension ROM. - goal met    Long Term Goals [6 weeks; target date: 25]  1.  Patient will demonstrate an increase in cervical AROM to WNL in order to promote self-care activities pain-free. - in progress  2. Patient will present with mild restriction in thoracic ROM. - in progress  3. Patient will be able to self-correct posture in the clinic 75% of the time or greater. - in progress  4. Patient will be independent with comprehensive HEP. - in progress    Plan  Patient would benefit from: skilled physical therapy  Planned modality interventions: cryotherapy, electrical stimulation/Russian stimulation, TENS, ultrasound, thermotherapy: hydrocollator packs, traction, high voltage pulsed current: spasm management and high voltage pulsed current: pain management    Planned therapy interventions: flexibility, functional ROM exercises, graded exercise, home exercise program, joint mobilization, manual therapy, neuromuscular re-education, patient education, strengthening, stretching, therapeutic exercise, therapeutic activities, balance/weight bearing training, gait training, abdominal trunk stabilization, self care, postural training, activity modification, ADL retraining, ADL training, balance, behavior modification, body mechanics training, breathing training, therapeutic training, transfer training, graded activity, graded motor, muscle pump exercises, [place] taping and IADL retraining    Frequency: 1-2x week  Duration in weeks: 6  Plan of Care beginning date: 12/31/2024  Plan of Care expiration date: 2/14/2025  Treatment plan discussed with: patient      Subjective Evaluation    History of Present Illness  Mechanism of injury: Pt reports that she has had neck problems for most of her life. Pt states that she had previously used chiropractic. Pt states she has undergone more stress since August 2024. Pt states she has had muscle spasms that affect both sides of the neck. Pt states that due to neck pain, she is still able to get things done, but has pain after doing things. Pt reports pain  "with grocery shopping and has difficulty moving. Pt states that she has increased pain after household chores.     (24) Pt reports progress \"a little less than 50%\" since starting PT. Pt states that her pain is not severe as it once was and has less frequent spasms than before. Pt states that recently, her symptoms were more in the right arm compared to left. Pt states it was especially bad during the rainy weather over the past several days. Pt states chiropractic care is also still helping.   Patient Goals  Patient goals for therapy: increased motion  Patient goal: To have less tightness  Pain  Current pain ratin  At best pain ratin  At worst pain ratin  Location: Posterior neck to shoulders  Quality: dull ache and radiating (Numbness in both arms)  Relieving factors: heat (Exercises)  Aggravating factors: overhead activity and lifting    Social Support  Lives with: significant other    Employment status: not working  Hand dominance: right  Exercise history: 3-4x/week 20 minutes  Life stress: High - recently friends/loved ones passed away      Diagnostic Tests  No diagnostic tests performed  Treatments  Previous treatment: physical therapy, chiropractic and medication      Objective     Postural Observations  Seated posture: fair    Additional Postural Observation Details  Slight forward head, rounded shoulder posture    Palpation   Left   Hypertonic in the cervical paraspinals, levator scapulae and upper trapezius.   Tenderness of the cervical paraspinals and upper trapezius.     Right   Hypertonic in the cervical paraspinals, levator scapulae and upper trapezius.   Tenderness of the cervical paraspinals and upper trapezius.     Active Range of Motion   Cervical/Thoracic Spine       Cervical    Flexion: 50 degrees   Extension: 35 (Tightness) degrees      Left lateral flexion: 40 degrees      Right lateral flexion: 40 degrees     with pain  Left rotation: 70 degrees  Right rotation: 80 degrees "       Thoracic    Extension:  Restriction level: moderate    Strength/Myotome Testing   Cervical Spine     Left   Interossei strength (t1): 5  Levator scapulae (C4): 5    Right   Interossei strength (t1): 5  Levator scapulae (C4): 5    Left Shoulder     Planes of Motion   Abduction: 4+     Isolated Muscles   Levator scapulae: 5     Right Shoulder     Planes of Motion   Abduction: 4+     Isolated Muscles   Levator scapulae: 5     Left Elbow   Flexion: 5  Extension: 5    Right Elbow   Flexion: 5  Extension: 5    Left Wrist/Hand   Thumb extension: 5    Right Wrist/Hand   Thumb extension: 5    Additional Strength Details   strength  Right moderately limited compared to left with handshake (right hand dominant)   dynamometer (DANA position 2)  Right = 30 lbs  Left = 45 lbs                POC Expires Reeval for Medicare to be completed Unit Limit Auth Status Auth Expiration Date PT/OT/STVisit Limit FOTO   2/14/25 By visit N/A 6 units Pending 12/31/24 8 visits      Completed on visit N/A                    Precautions: Standard    Auth Status         Pending  Visit # 3 4 5 6 7 8 (RE-EVAL)   Visits Remaining 5 4 3 2  1 0            Manuals 11/29/24 12/3/24 12/10/24 12/17/24 12/24/24 12/31/24   STM/MFR B/L UT L > R, cervical paraspinals B/L UT L > R, cervical paraspinals B/L UT L > R, cervical paraspinals B/L UT L > R, cervical paraspinals B/L UT L > R, cervical paraspinals B/L UT L > R, cervical paraspinals   Manual flexibility   Nerve mobility radial, median BUE      Joint mobs cervical LAD  Cervical distraction w/ SOR Cervical distraction w/ SOR Cervical distraction w/ SOR Cervical distraction w/ SOR Cervical distraction w/ SOR            Ther Ex         Cervical AROM         Seated thoracic extension 2x10 (5s) over 1/2 foam roll  2x10 (5s) over 1/2 FR 2x10 (5s) over 1/2 FR  2x10 (5s) over 1/2 FR  2x10 (5s) over 1/2 FR 2x10 (5s) over 1/2 FR   Cervical SNAG extension         Open book 10x5s B/L  10x5s B/L  10x5s  B/L  10x5s B/L  10x5s B/L     Nerve glides --> 20x B/L median                                                     Neuro Re-Ed         Cervical retractions 2x10 (3s) 2x10 (3s)  10x3s 10x seated 10x seated 10x seated   Chin tucks Supine 2x10 (3s)  Supine 2x10 (3s)  Supine 2x10 (3s)  Supine 2x10 (3s)  Supine 2x10 (3s)  Supine 2x10 (3s)    Scap retractions   2x10 (5s)  2x10 (5s)  2x10 (5s)     Rows 2x10 GTB  2x10 orange TB  2x10 orange TB 2x10 green TB   Shoulder extensions 2x10 orange TB  2x10 orange TB  2x10 orange TB 2x10 green TB   B/L ER    10x orange TB      Towel slides w/ ER iso    10x5s  Held                               Ther Activity         Pt education      Updated POC                                Access Code: [email or web address]  URL: [email or web address]  Date: 11/14/2024  Prepared by: Madan Alegre    Exercises  - Seated Cervical Retraction  - 2 x daily - 7 x weekly - 2 sets - 10 reps - 3 hold  - Seated Thoracic Extension with Hands Supporting Neck  - 2 x daily - 7 x weekly - 2 sets - 10 reps - 5 hold  - Cervical Extension AROM with Strap  - 2 x daily - 7 x weekly - 2 sets - 10 reps - 3 hold

## 2025-01-08 ENCOUNTER — OFFICE VISIT (OUTPATIENT)
Dept: PHYSICAL THERAPY | Facility: CLINIC | Age: 70
End: 2025-01-08
Payer: COMMERCIAL

## 2025-01-08 DIAGNOSIS — M62.838 NECK MUSCLE SPASM: Primary | ICD-10-CM

## 2025-01-08 PROCEDURE — 97110 THERAPEUTIC EXERCISES: CPT

## 2025-01-08 PROCEDURE — 97112 NEUROMUSCULAR REEDUCATION: CPT

## 2025-01-08 PROCEDURE — 97140 MANUAL THERAPY 1/> REGIONS: CPT

## 2025-01-08 NOTE — PROGRESS NOTES
Daily Note      Today's date: 2025  Patient name: Nemea Leger  : 1955  MRN: [id number]  Referring provider: Kathi Vance DO  Dx:   Encounter Diagnosis     ICD-10-CM    1. Neck muscle spasm  M62.838           Subjective: Pt reports that she had increased soreness after last visit, but over the past ~3 days, her neck has been doing really well.        Objective: See treatment diary below    Assessment: Pt tolerated treatment well.  Pt presented with more tension on the right SCM and cervical paraspinals compared to left.  Pt resumed with cervical SNAG extension and noted fatigue of her hands from gripping. Pt noted improved rotation ROM throughout the session.     Plan: Continue per plan of care.           POC Expires Reeval for Medicare to be completed Unit Limit Auth Status Auth Expiration Date PT/OT/STVisit Limit FOTO   25 By visit N/A 6 units Pending Pending 8 visits      Completed on visit N/A                    Precautions: Standard    Auth Status 24      Pending  Visit # 7 8 (RE-EVAL) 1      Visits Remaining 1 0                Manuals         STM/MFR B/L UT L > R, cervical paraspinals B/L UT L > R, cervical paraspinals B/L UT L > R, cervical paraspinals      Manual flexibility         Joint mobs cervical Cervical distraction w/ SOR Cervical distraction w/ SOR Cervical distraction w/ SOR               Ther Ex         Cervical AROM         Seated thoracic extension 2x10 (5s) over 1/2 FR 2x10 (5s) over 1/2 FR 2x10 (5s) over 1/2 FR      Cervical SNAG extension   2x10 (5s)       Open book 10x5s B/L   10x5s      Nerve glides                                                      Neuro Re-Ed         Cervical retractions 10x seated 10x seated 10x seated      Chin tucks Supine 2x10 (3s)  Supine 2x10 (3s)  2x10 (3s)       Scap retractions 2x10 (5s)         Rows 2x10 orange TB 2x10 green TB 2x10 green TB      Shoulder extensions 2x10 orange TB 2x10 green TB 2x10 green TB       B/L ER          Towel slides w/ ER iso Held                                   Ther Activity         Pt education  Updated POC                                    Access Code: [email or web address]  URL: [email or web address]  Date: 11/14/2024  Prepared by: Madan Alegre    Exercises  - Seated Cervical Retraction  - 2 x daily - 7 x weekly - 2 sets - 10 reps - 3 hold  - Seated Thoracic Extension with Hands Supporting Neck  - 2 x daily - 7 x weekly - 2 sets - 10 reps - 5 hold  - Cervical Extension AROM with Strap  - 2 x daily - 7 x weekly - 2 sets - 10 reps - 3 hold

## 2025-01-10 ENCOUNTER — PROCEDURE VISIT (OUTPATIENT)
Age: 70
End: 2025-01-10
Payer: COMMERCIAL

## 2025-01-10 VITALS — WEIGHT: 134 LBS | BODY MASS INDEX: 24.51 KG/M2

## 2025-01-10 DIAGNOSIS — M47.812 CERVICAL SPONDYLOSIS: ICD-10-CM

## 2025-01-10 DIAGNOSIS — M99.01 SEGMENTAL DYSFUNCTION OF CERVICAL REGION: ICD-10-CM

## 2025-01-10 DIAGNOSIS — M99.02 SEGMENTAL DYSFUNCTION OF THORACIC REGION: ICD-10-CM

## 2025-01-10 DIAGNOSIS — M99.03 SEGMENTAL DYSFUNCTION OF LUMBAR REGION: ICD-10-CM

## 2025-01-10 DIAGNOSIS — M62.838 NECK MUSCLE SPASM: Primary | ICD-10-CM

## 2025-01-10 PROCEDURE — 98941 CHIROPRACT MANJ 3-4 REGIONS: CPT | Performed by: CHIROPRACTOR

## 2025-01-10 NOTE — PROGRESS NOTES
Diagnoses and all orders for this visit:    Neck muscle spasm    Segmental dysfunction of cervical region    Segmental dysfunction of thoracic region    Segmental dysfunction of lumbar region    Cervical spondylosis     DATE OF FIRST VISIT: 11/22/24    ASSESSMENT:   The symptoms and exam findings consistent with spondylosis of cervical spine secondary to repetitive st/sp injury, exacerbated by postural/ergonomic stressors. Pt responded well to stretches and manual mobilization of the affected spinal and myofascial tissues with increased ROM; trial of conservative tx recommended consisting of stretching, ther-ex, graded mobilization/manipulation of the affected spinal/myofascial tissues, postural/ergonomic education and take home stretches/exercises. If symptoms fail to improve with short trial of conservative care, appropriate imaging and referral will be coordinated.  - Tolerated treatment well with decrease in tightness and pain.     PROCEDURE CODES: 47326-ET    TREATMENT:  Fear avoidance behavior discussion, encouraged and reassured pt that natural course of condition is to improve over time with adherence to tx plan and home care strategies. Home care recommendations: avoid bed rest, walk (but avoid trails and uneven surfaces), gradual return to activity to tolerance (avoid anything that peripheralizes symptoms), ice 20 min on/off, watch for ice burn, call if symptoms peripheralize, worsen, or neurologic deficit progresses. Ther-ex: IASTM - discussed post procedure soreness and/or ecchymosis for up to 36 hrs, applied to affected mm hypertonicities; wall kandi, axial retraction, upper trap stretch, lev scap stretch, SCM stretch, lat rows with t-band, lat pull down with t-band, abdominal bracing; greater than 15 min spent performing above mentioned ther-ex. Thoracic mobilization/manipulation: prone P-A mob; cervical mobilization/manipulation: diversified supine graded mobilization, cervical traction, prone C/T jct  HVLA    HPI:  Neema Leger is a 69 y.o. female   Chief Complaint   Patient presents with   • Neck Pain     2/10 stiff   • Shoulder Pain     The patient presents to the office with increase pain in the mid back and into the neck that started on Labor day without traumatic event. The patient knows it has to do with stress which was high in June 2024. The patient lost several dear friends since then. Stress is lessening now. The patient has dull pain into the arms that also shoots R thumb. Weakness is getting worse. She started PT recently and its going well. The patient went to Kathi Vance DO her PCP and the patient was referred here for consult. Pain can at times wake her up but recently its been better. Valerian root helps her sleep. Pain is moderate today but at times when they spasm can be a 10/10. No HA.  12/16- The patient reports more stiff and achiness today more than pain 6/10. But the weekend before she had a lot of spasms do to the physical activities  12/23- Thd patient is achy 5/10 in the neck and mid back and lower back today.  1/10/25- The patient reports overall she is improvements. Still has spasms when she overdoes it.     Neck Pain     Shoulder Pain     Back Pain    Past Medical History:   Diagnosis Date   • Seasonal allergies    • Vertigo       The following portions of the patient's history were reviewed and updated as appropriate: allergies, past family history, past medical history, past social history, past surgical history, and problem list.  Review of Systems   Musculoskeletal:  Positive for arthralgias, back pain, myalgias, neck pain and neck stiffness.     Physical Exam  Constitutional:       Appearance: Normal appearance.   Musculoskeletal:         General: Tenderness present. Normal range of motion.        Arms:       Cervical back: Normal range of motion. Rigidity and tenderness present.   Neurological:      Mental Status: She is alert and oriented to person, place, and time.      Gait:  Gait is intact.      Deep Tendon Reflexes: Reflexes are normal and symmetric.   Psychiatric:         Attention and Perception: Attention normal.         Mood and Affect: Affect normal.         Speech: Speech normal.         Behavior: Behavior is cooperative.         Cognition and Memory: Cognition normal.     SOFT TISSUE ASSESSMENT: Hypertonicity and tenderness palpated C5-L1 erector spinae, upper/middle traps, lev scap, SCM, scalene, rhomboid, suboccipitals JOINT RECTRICTIONS: C5-T6, T10-L1 ORTHO: Tatyana unremarkable for centralization/peripheralization; max foraminal comp elicits local np R/L; shoulder depression elicits stiffness in R/L upper trap; brachial plexus tension test elicits neural tension in R/L UE; cervical distraction elicits relieves CC    Return in about 1 week (around 1/17/2025) for Recheck.

## 2025-01-15 ENCOUNTER — APPOINTMENT (OUTPATIENT)
Dept: PHYSICAL THERAPY | Facility: CLINIC | Age: 70
End: 2025-01-15
Payer: COMMERCIAL

## 2025-01-17 ENCOUNTER — PROCEDURE VISIT (OUTPATIENT)
Age: 70
End: 2025-01-17
Payer: COMMERCIAL

## 2025-01-17 VITALS — WEIGHT: 134 LBS | HEIGHT: 62 IN | BODY MASS INDEX: 24.66 KG/M2

## 2025-01-17 DIAGNOSIS — M99.01 SEGMENTAL DYSFUNCTION OF CERVICAL REGION: ICD-10-CM

## 2025-01-17 DIAGNOSIS — M62.838 NECK MUSCLE SPASM: Primary | ICD-10-CM

## 2025-01-17 DIAGNOSIS — M47.812 CERVICAL SPONDYLOSIS: ICD-10-CM

## 2025-01-17 DIAGNOSIS — M99.02 SEGMENTAL DYSFUNCTION OF THORACIC REGION: ICD-10-CM

## 2025-01-17 DIAGNOSIS — M99.03 SEGMENTAL DYSFUNCTION OF LUMBAR REGION: ICD-10-CM

## 2025-01-17 PROCEDURE — 98941 CHIROPRACT MANJ 3-4 REGIONS: CPT | Performed by: CHIROPRACTOR

## 2025-01-17 NOTE — PROGRESS NOTES
Diagnoses and all orders for this visit:    Neck muscle spasm    Segmental dysfunction of cervical region    Segmental dysfunction of thoracic region    Segmental dysfunction of lumbar region    Cervical spondylosis     DATE OF FIRST VISIT: 11/22/24    ASSESSMENT:   The symptoms and exam findings consistent with spondylosis of cervical spine secondary to repetitive st/sp injury, exacerbated by postural/ergonomic stressors. Pt responded well to stretches and manual mobilization of the affected spinal and myofascial tissues with increased ROM; trial of conservative tx recommended consisting of stretching, ther-ex, graded mobilization/manipulation of the affected spinal/myofascial tissues, postural/ergonomic education and take home stretches/exercises. If symptoms fail to improve with short trial of conservative care, appropriate imaging and referral will be coordinated.  - Tolerated treatment well with decrease in tightness and pain.     PROCEDURE CODES: 90655-PQ    TREATMENT:  Fear avoidance behavior discussion, encouraged and reassured pt that natural course of condition is to improve over time with adherence to tx plan and home care strategies. Home care recommendations: avoid bed rest, walk (but avoid trails and uneven surfaces), gradual return to activity to tolerance (avoid anything that peripheralizes symptoms), ice 20 min on/off, watch for ice burn, call if symptoms peripheralize, worsen, or neurologic deficit progresses. Ther-ex: IASTM - discussed post procedure soreness and/or ecchymosis for up to 36 hrs, applied to affected mm hypertonicities; wall kandi, axial retraction, upper trap stretch, lev scap stretch, SCM stretch, lat rows with t-band, lat pull down with t-band, abdominal bracing; greater than 15 min spent performing above mentioned ther-ex. Thoracic mobilization/manipulation: prone P-A mob; cervical mobilization/manipulation: diversified supine graded mobilization, cervical traction, prone C/T jct  HVLA    HPI:  Neema Leger is a 69 y.o. female   Chief Complaint   Patient presents with   • Neck Pain     Neck pain-5   • Shoulder Pain     Shoulder pain-5  Both shoulders    • Back Pain     Middle back pain-3     The patient presents to the office with increase pain in the mid back and into the neck that started on Labor day without traumatic event. The patient knows it has to do with stress which was high in June 2024. The patient lost several dear friends since then. Stress is lessening now. The patient has dull pain into the arms that also shoots R thumb. Weakness is getting worse. She started PT recently and its going well. The patient went to Kathi Vance DO her PCP and the patient was referred here for consult. Pain can at times wake her up but recently its been better. Valerian root helps her sleep. Pain is moderate today but at times when they spasm can be a 10/10. No HA.  12/16- The patient reports more stiff and achiness today more than pain 6/10. But the weekend before she had a lot of spasms do to the physical activities  12/23- Thd patient is achy 5/10 in the neck and mid back and lower back today.  1/10/25- The patient reports overall she is improvements. Still has spasms when she overdoes it.   1/17/25- The patient has increased pain into the arms today. 5/10 in the neck and shoulder, and 3/10 in the mid back.     Neck Pain     Shoulder Pain     Back Pain    Past Medical History:   Diagnosis Date   • Seasonal allergies    • Vertigo       The following portions of the patient's history were reviewed and updated as appropriate: allergies, past family history, past medical history, past social history, past surgical history, and problem list.  Review of Systems   Musculoskeletal:  Positive for arthralgias, back pain, myalgias, neck pain and neck stiffness.     Physical Exam  Constitutional:       Appearance: Normal appearance.   Musculoskeletal:         General: Tenderness present. Normal range of motion.         Arms:       Cervical back: Normal range of motion. Rigidity and tenderness present.   Neurological:      Mental Status: She is alert and oriented to person, place, and time.      Gait: Gait is intact.      Deep Tendon Reflexes: Reflexes are normal and symmetric.   Psychiatric:         Attention and Perception: Attention normal.         Mood and Affect: Affect normal.         Speech: Speech normal.         Behavior: Behavior is cooperative.         Cognition and Memory: Cognition normal.     SOFT TISSUE ASSESSMENT: Hypertonicity and tenderness palpated C5-L1 erector spinae, upper/middle traps, lev scap, SCM, scalene, rhomboid, suboccipitals JOINT RECTRICTIONS: C5-T6, T10-L1 ORTHO: Tatyana unremarkable for centralization/peripheralization; max foraminal comp elicits local np R/L; shoulder depression elicits stiffness in R/L upper trap; brachial plexus tension test elicits neural tension in R/L UE; cervical distraction elicits relieves CC    Return in about 1 week (around 1/24/2025) for Recheck.

## 2025-01-22 ENCOUNTER — OFFICE VISIT (OUTPATIENT)
Dept: PHYSICAL THERAPY | Facility: CLINIC | Age: 70
End: 2025-01-22
Payer: COMMERCIAL

## 2025-01-22 DIAGNOSIS — M62.838 NECK MUSCLE SPASM: Primary | ICD-10-CM

## 2025-01-22 PROCEDURE — 97112 NEUROMUSCULAR REEDUCATION: CPT

## 2025-01-22 PROCEDURE — 97110 THERAPEUTIC EXERCISES: CPT

## 2025-01-22 PROCEDURE — 97140 MANUAL THERAPY 1/> REGIONS: CPT

## 2025-01-22 NOTE — PROGRESS NOTES
Daily Note      Today's date: 2025  Patient name: Neema Leger  : 1955  MRN: [id number]  Referring provider: Kathi Vance DO  Dx:   Encounter Diagnosis     ICD-10-CM    1. Neck muscle spasm  M62.838           Subjective: Pt reports that she has had increased spasm in her neck that travels down both arms, but does not pass the elbow. Pt states that pain is not as bad as it has been and recognizes that her ROM is better.        Objective: See treatment diary below    Assessment: Pt tolerated treatment well.  Pt noted tenderness and increased tissue tension in the L UT compared to R. Pt noted some relief of stiffness in the neck after performing open books and thoracic extension. Pt performed decreased resistance exercise this visit due to recent exacerbation of muscle spasms with HEP.      Plan: Continue per plan of care.           POC Expires Reeval for Medicare to be completed Unit Limit Auth Status Auth Expiration Date PT/OT/STVisit Limit FOTO   25 By visit N/A 6 units Approved 25 N/A     Completed on visit N/A                    Precautions: Standard    Auth Status 25       Approved  Visit # 1 2       Visits Remaining                  Manuals         STM/MFR B/L UT L > R, cervical paraspinals B/L UT L > R, cervical paraspinals       Manual flexibility         Joint mobs cervical Cervical distraction w/ SOR Cervical distraction w/ SOR                Ther Ex         Cervical AROM         Seated thoracic extension 2x10 (5s) over 1/2 FR 2x10 (5s) over 1/2 FR       Cervical SNAG extension 2x10 (5s)  2x10 (5s)        Open book 10x5s 10x5s       Nerve glides                                                      Neuro Re-Ed         Cervical retractions 10x seated 10x seated       Chin tucks 2x10 (3s)  Supine 10x3s        Scap retractions  15x5s        Rows 2x10 green TB        Shoulder extensions 2x10 green TB        B/L ER          Towel slides w/ ER iso                                     Ther Activity         Pt education                                      Access Code: [email or web address]  URL: [email or web address]  Date: 11/14/2024  Prepared by: Madan Alegre    Exercises  - Seated Cervical Retraction  - 2 x daily - 7 x weekly - 2 sets - 10 reps - 3 hold  - Seated Thoracic Extension with Hands Supporting Neck  - 2 x daily - 7 x weekly - 2 sets - 10 reps - 5 hold  - Cervical Extension AROM with Strap  - 2 x daily - 7 x weekly - 2 sets - 10 reps - 3 hold

## 2025-01-24 ENCOUNTER — PROCEDURE VISIT (OUTPATIENT)
Age: 70
End: 2025-01-24
Payer: COMMERCIAL

## 2025-01-24 VITALS — WEIGHT: 134 LBS | HEIGHT: 62 IN | BODY MASS INDEX: 24.66 KG/M2

## 2025-01-24 DIAGNOSIS — M62.838 NECK MUSCLE SPASM: Primary | ICD-10-CM

## 2025-01-24 DIAGNOSIS — M99.01 SEGMENTAL DYSFUNCTION OF CERVICAL REGION: ICD-10-CM

## 2025-01-24 DIAGNOSIS — M99.02 SEGMENTAL DYSFUNCTION OF THORACIC REGION: ICD-10-CM

## 2025-01-24 DIAGNOSIS — M47.812 CERVICAL SPONDYLOSIS: ICD-10-CM

## 2025-01-24 DIAGNOSIS — M99.03 SEGMENTAL DYSFUNCTION OF LUMBAR REGION: ICD-10-CM

## 2025-01-24 PROCEDURE — 98941 CHIROPRACT MANJ 3-4 REGIONS: CPT | Performed by: CHIROPRACTOR

## 2025-01-24 NOTE — PROGRESS NOTES
Diagnoses and all orders for this visit:    Neck muscle spasm    Segmental dysfunction of cervical region    Segmental dysfunction of thoracic region    Segmental dysfunction of lumbar region    Cervical spondylosis     DATE OF FIRST VISIT: 11/22/24    ASSESSMENT:   The symptoms and exam findings consistent with spondylosis of cervical spine secondary to repetitive st/sp injury, exacerbated by postural/ergonomic stressors. Pt responded well to stretches and manual mobilization of the affected spinal and myofascial tissues with increased ROM; trial of conservative tx recommended consisting of stretching, ther-ex, graded mobilization/manipulation of the affected spinal/myofascial tissues, postural/ergonomic education and take home stretches/exercises. If symptoms fail to improve with short trial of conservative care, appropriate imaging and referral will be coordinated.  - Tolerated treatment well with decrease in tightness and pain.     PROCEDURE CODES: [id number]-BI    TREATMENT:  Fear avoidance behavior discussion, encouraged and reassured pt that natural course of condition is to improve over time with adherence to tx plan and home care strategies. Home care recommendations: avoid bed rest, walk (but avoid trails and uneven surfaces), gradual return to activity to tolerance (avoid anything that peripheralizes symptoms), ice 20 min on/off, watch for ice burn, call if symptoms peripheralize, worsen, or neurologic deficit progresses. Ther-ex: IASTM - discussed post procedure soreness and/or ecchymosis for up to 36 hrs, applied to affected mm hypertonicities; wall kandi, axial retraction, upper trap stretch, lev scap stretch, SCM stretch, lat rows with t-band, lat pull down with t-band, abdominal bracing; greater than 15 min spent performing above mentioned ther-ex. Thoracic mobilization/manipulation: prone P-A mob; cervical mobilization/manipulation: diversified supine graded mobilization, cervical traction, prone C/T jct  HVLA    HPI:  Neema Leger is a 69 y.o. female   Chief Complaint   Patient presents with   • Neck Pain     Neck pain-7   • Shoulder Pain     Shoulder pain-7  Both shoulders   • Back Pain     Middle back pain-7     The patient presents to the office with increase pain in the mid back and into the neck that started on Labor day without traumatic event. The patient knows it has to do with stress which was high in June 2024. The patient lost several dear friends since then. Stress is lessening now. The patient has dull pain into the arms that also shoots R thumb. Weakness is getting worse. She started PT recently and its going well. The patient went to Kathi Vance DO her PCP and the patient was referred here for consult. Pain can at times wake her up but recently its been better. Valerian root helps her sleep. Pain is moderate today but at times when they spasm can be a 10/10. No HA.  12/16- The patient reports more stiff and achiness today more than pain 6/10. But the weekend before she had a lot of spasms do to the physical activities  12/23- Thd patient is achy 5/10 in the neck and mid back and lower back today.  1/10/25- The patient reports overall she is improvements. Still has spasms when she overdoes it.   1/17/25- The patient has increased pain into the arms today. 5/10 in the neck and shoulder, and 3/10 in the mid back.   1/24-The pt 7/10 in the neck and mid back and into the shoulders all are worse today.    Neck Pain     Shoulder Pain     Back Pain    Past Medical History:   Diagnosis Date   • Seasonal allergies    • Vertigo       The following portions of the patient's history were reviewed and updated as appropriate: allergies, past family history, past medical history, past social history, past surgical history, and problem list.  Review of Systems   Musculoskeletal:  Positive for arthralgias, back pain, myalgias, neck pain and neck stiffness.     Physical Exam  Constitutional:       Appearance: Normal  appearance.   Musculoskeletal:         General: Tenderness present. Normal range of motion.        Arms:       Cervical back: Normal range of motion. Rigidity and tenderness present.   Neurological:      Mental Status: She is alert and oriented to person, place, and time.      Gait: Gait is intact.      Deep Tendon Reflexes: Reflexes are normal and symmetric.   Psychiatric:         Attention and Perception: Attention normal.         Mood and Affect: Affect normal.         Speech: Speech normal.         Behavior: Behavior is cooperative.         Cognition and Memory: Cognition normal.     SOFT TISSUE ASSESSMENT: Hypertonicity and tenderness palpated C5-L1 erector spinae, upper/middle traps, lev scap, SCM, scalene, rhomboid, suboccipitals JOINT RECTRICTIONS: C5-T6, T10-L1 ORTHO: Tatyana unremarkable for centralization/peripheralization; max foraminal comp elicits local np R/L; shoulder depression elicits stiffness in R/L upper trap; brachial plexus tension test elicits neural tension in R/L UE; cervical distraction elicits relieves CC    Return in about 1 week (around 1/31/2025) for Recheck.

## 2025-01-29 ENCOUNTER — OFFICE VISIT (OUTPATIENT)
Dept: PHYSICAL THERAPY | Facility: CLINIC | Age: 70
End: 2025-01-29
Payer: COMMERCIAL

## 2025-01-29 DIAGNOSIS — M62.838 NECK MUSCLE SPASM: Primary | ICD-10-CM

## 2025-01-29 PROCEDURE — 97110 THERAPEUTIC EXERCISES: CPT

## 2025-01-29 PROCEDURE — 97112 NEUROMUSCULAR REEDUCATION: CPT

## 2025-01-29 PROCEDURE — 97140 MANUAL THERAPY 1/> REGIONS: CPT

## 2025-01-29 NOTE — PROGRESS NOTES
"Daily Note      Today's date: 2025  Patient name: Neema Leger  : 1955  MRN: [id number]  Referring provider: Kathi Vance DO  Dx:   Encounter Diagnosis     ICD-10-CM    1. Neck muscle spasm  M62.838           Subjective: Pt reports that she has had a bad week in terms of neck muscle spasm. Pt states that her pain tends to be worse at night, and not bad during the day. Pt states that she tries to get as much as she can during the day because that is when her symptoms are lowest. Pt states she vacuumed and dusted on , but \"paid for it\" during the days after.        Objective: See treatment diary below    Assessment: Pt tolerated treatment well.  Pt presented with more tension on the L UT compared to R, which she states is typical for her. Pt demonstrates good knowledge of performing mobility exercise to prevent worsening of muscle spasm.     Plan: Continue per plan of care.           POC Expires Reeval for Medicare to be completed Unit Limit Auth Status Auth Expiration Date PT/OT/STVisit Limit FOTO   25 By visit N/A 6 units Approved 25 N/A     Completed on visit N/A                    Precautions: Standard    Auth Status 25      Approved  Visit # 1 2 3      Visits Remaining                  Manuals         STM/MFR B/L UT L > R, cervical paraspinals B/L UT L > R, cervical paraspinals B/L UT L > R, cervical paraspinals      Manual flexibility         Joint mobs cervical Cervical distraction w/ SOR Cervical distraction w/ SOR Cervical distraction w/ SOR               Ther Ex         Cervical AROM         Seated thoracic extension 2x10 (5s) over 1/2 FR 2x10 (5s) over 1/2 FR 2x10 (5s) over 1/2 FR       Cervical SNAG extension 2x10 (5s)  2x10 (5s)  2x10 (5s)       Open book 10x5s 10x5s 10x5s       Nerve glides                                                      Neuro Re-Ed         Cervical retractions 10x seated 10x seated 10x seated      Chin tucks 2x10 (3s)  " Supine 10x3s  2x10 (3s) supine      Scap retractions  15x5s  2x10 (5s)      Rows 2x10 green TB  2x6 OTB      Shoulder extensions 2x10 green TB  2x6 OTB      B/L ER          Towel slides w/ ER iso                                    Ther Activity         Pt education                                      Access Code: [email or web address]  URL: [email or web address]  Date: 11/14/2024  Prepared by: Madan Alegre    Exercises  - Seated Cervical Retraction  - 2 x daily - 7 x weekly - 2 sets - 10 reps - 3 hold  - Seated Thoracic Extension with Hands Supporting Neck  - 2 x daily - 7 x weekly - 2 sets - 10 reps - 5 hold  - Cervical Extension AROM with Strap  - 2 x daily - 7 x weekly - 2 sets - 10 reps - 3 hold

## 2025-02-03 ENCOUNTER — PROCEDURE VISIT (OUTPATIENT)
Age: 70
End: 2025-02-03
Payer: COMMERCIAL

## 2025-02-03 VITALS — WEIGHT: 134 LBS | HEIGHT: 62 IN | BODY MASS INDEX: 24.66 KG/M2

## 2025-02-03 DIAGNOSIS — M47.812 CERVICAL SPONDYLOSIS: ICD-10-CM

## 2025-02-03 DIAGNOSIS — M99.03 SEGMENTAL DYSFUNCTION OF LUMBAR REGION: ICD-10-CM

## 2025-02-03 DIAGNOSIS — M62.838 NECK MUSCLE SPASM: Primary | ICD-10-CM

## 2025-02-03 DIAGNOSIS — M99.02 SEGMENTAL DYSFUNCTION OF THORACIC REGION: ICD-10-CM

## 2025-02-03 DIAGNOSIS — M99.01 SEGMENTAL DYSFUNCTION OF CERVICAL REGION: ICD-10-CM

## 2025-02-03 PROCEDURE — 98941 CHIROPRACT MANJ 3-4 REGIONS: CPT | Performed by: CHIROPRACTOR

## 2025-02-03 NOTE — PROGRESS NOTES
Diagnoses and all orders for this visit:    Neck muscle spasm    Segmental dysfunction of cervical region    Segmental dysfunction of thoracic region    Segmental dysfunction of lumbar region    Cervical spondylosis     DATE OF FIRST VISIT: 11/22/24    ASSESSMENT:   The symptoms and exam findings consistent with spondylosis of cervical spine secondary to repetitive st/sp injury, exacerbated by postural/ergonomic stressors. Pt responded well to stretches and manual mobilization of the affected spinal and myofascial tissues with increased ROM; trial of conservative tx recommended consisting of stretching, ther-ex, graded mobilization/manipulation of the affected spinal/myofascial tissues, postural/ergonomic education and take home stretches/exercises. If symptoms fail to improve with short trial of conservative care, appropriate imaging and referral will be coordinated.  - Tolerated treatment well with decrease in tightness and pain.     PROCEDURE CODES: [id number]    TREATMENT:  Fear avoidance behavior discussion, encouraged and reassured pt that natural course of condition is to improve over time with adherence to tx plan and home care strategies. Home care recommendations: avoid bed rest, walk (but avoid trails and uneven surfaces), gradual return to activity to tolerance (avoid anything that peripheralizes symptoms), ice 20 min on/off, watch for ice burn, call if symptoms peripheralize, worsen, or neurologic deficit progresses. Ther-ex: IASTM - discussed post procedure soreness and/or ecchymosis for up to 36 hrs, applied to affected mm hypertonicities; wall kandi, axial retraction, upper trap stretch, lev scap stretch, SCM stretch, lat rows with t-band, lat pull down with t-band, abdominal bracing; greater than 15 min spent performing above mentioned ther-ex. Thoracic mobilization/manipulation: prone P-A mob; cervical mobilization/manipulation: diversified supine graded mobilization, cervical traction, prone C/T jct  HVLA    HPI:  Neema Leger is a 69 y.o. female   Chief Complaint   Patient presents with   • Neck Pain     Neck pain-3   • Shoulder Pain     Shoulder pain-3  Both shoulders    • Back Pain     Middle back pain-3     The patient presents to the office with increase pain in the mid back and into the neck that started on Labor day without traumatic event. The patient knows it has to do with stress which was high in June 2024. The patient lost several dear friends since then. Stress is lessening now. The patient has dull pain into the arms that also shoots R thumb. Weakness is getting worse. She started PT recently and its going well. The patient went to Kathi aVnce DO her PCP and the patient was referred here for consult. Pain can at times wake her up but recently its been better. Valerian root helps her sleep. Pain is moderate today but at times when they spasm can be a 10/10. No HA.  12/16- The patient reports more stiff and achiness today more than pain 6/10. But the weekend before she had a lot of spasms do to the physical activities  12/23- Thd patient is achy 5/10 in the neck and mid back and lower back today.  1/10/25- The patient reports overall she is improvements. Still has spasms when she overdoes it.   1/17/25- The patient has increased pain into the arms today. 5/10 in the neck and shoulder, and 3/10 in the mid back.   1/24-The pt 7/10 in the neck and mid back and into the shoulders all are worse today.  2/3- The patient has achiness in the neck and L shoulder    Neck Pain     Shoulder Pain     Back Pain      Past Medical History:   Diagnosis Date   • Seasonal allergies    • Vertigo       The following portions of the patient's history were reviewed and updated as appropriate: allergies, past family history, past medical history, past social history, past surgical history, and problem list.  Review of Systems   Musculoskeletal:  Positive for arthralgias, back pain, myalgias, neck pain and neck stiffness.      Physical Exam  Constitutional:       Appearance: Normal appearance.   Musculoskeletal:         General: Tenderness present. Normal range of motion.        Arms:       Cervical back: Normal range of motion. Rigidity and tenderness present.   Neurological:      Mental Status: She is alert and oriented to person, place, and time.      Gait: Gait is intact.      Deep Tendon Reflexes: Reflexes are normal and symmetric.   Psychiatric:         Attention and Perception: Attention normal.         Mood and Affect: Affect normal.         Speech: Speech normal.         Behavior: Behavior is cooperative.         Cognition and Memory: Cognition normal.       SOFT TISSUE ASSESSMENT: Hypertonicity and tenderness palpated C5-L1 erector spinae, upper/middle traps, lev scap, SCM, scalene, rhomboid, suboccipitals JOINT RECTRICTIONS: C5-T6, T10-L1 ORTHO: Tatyana unremarkable for centralization/peripheralization; max foraminal comp elicits local np R/L; shoulder depression elicits stiffness in R/L upper trap; brachial plexus tension test elicits neural tension in R/L UE; cervical distraction elicits relieves CC    Return in about 1 week (around 2/10/2025) for Recheck.

## 2025-02-10 ENCOUNTER — PROCEDURE VISIT (OUTPATIENT)
Age: 70
End: 2025-02-10
Payer: COMMERCIAL

## 2025-02-10 VITALS — WEIGHT: 134 LBS | HEIGHT: 62 IN | BODY MASS INDEX: 24.66 KG/M2

## 2025-02-10 DIAGNOSIS — M47.812 CERVICAL SPONDYLOSIS: ICD-10-CM

## 2025-02-10 DIAGNOSIS — M99.03 SEGMENTAL DYSFUNCTION OF LUMBAR REGION: ICD-10-CM

## 2025-02-10 DIAGNOSIS — M99.01 SEGMENTAL DYSFUNCTION OF CERVICAL REGION: ICD-10-CM

## 2025-02-10 DIAGNOSIS — M99.02 SEGMENTAL DYSFUNCTION OF THORACIC REGION: ICD-10-CM

## 2025-02-10 DIAGNOSIS — M62.838 NECK MUSCLE SPASM: Primary | ICD-10-CM

## 2025-02-10 PROCEDURE — 98941 CHIROPRACT MANJ 3-4 REGIONS: CPT | Performed by: CHIROPRACTOR

## 2025-02-10 NOTE — PROGRESS NOTES
Diagnoses and all orders for this visit:    Neck muscle spasm    Segmental dysfunction of cervical region    Segmental dysfunction of thoracic region    Segmental dysfunction of lumbar region    Cervical spondylosis     DATE OF FIRST VISIT: 11/22/24    ASSESSMENT:   The symptoms and exam findings consistent with spondylosis of cervical spine secondary to repetitive st/sp injury, exacerbated by postural/ergonomic stressors. Pt responded well to stretches and manual mobilization of the affected spinal and myofascial tissues with increased ROM; trial of conservative tx recommended consisting of stretching, ther-ex, graded mobilization/manipulation of the affected spinal/myofascial tissues, postural/ergonomic education and take home stretches/exercises. If symptoms fail to improve with short trial of conservative care, appropriate imaging and referral will be coordinated.  - Tolerated treatment well with decrease in tightness and pain.     PROCEDURE CODES: [id number]    TREATMENT:  Fear avoidance behavior discussion, encouraged and reassured pt that natural course of condition is to improve over time with adherence to tx plan and home care strategies. Home care recommendations: avoid bed rest, walk (but avoid trails and uneven surfaces), gradual return to activity to tolerance (avoid anything that peripheralizes symptoms), ice 20 min on/off, watch for ice burn, call if symptoms peripheralize, worsen, or neurologic deficit progresses. Ther-ex: IASTM - discussed post procedure soreness and/or ecchymosis for up to 36 hrs, applied to affected mm hypertonicities; wall kandi, axial retraction, upper trap stretch, lev scap stretch, SCM stretch, lat rows with t-band, lat pull down with t-band, abdominal bracing; greater than 15 min spent performing above mentioned ther-ex. Thoracic mobilization/manipulation: prone P-A mob; cervical mobilization/manipulation: diversified supine graded mobilization, cervical traction, prone C/T jct  HVLA    HPI:  Neema Leger is a 69 y.o. female   Chief Complaint   Patient presents with   • Neck Pain     Neck pain-3   • Shoulder Pain     Shoulder pain-3  Both shoulders    • Back Pain     Middle back pain-3     The patient presents to the office with increase pain in the mid back and into the neck that started on Labor day without traumatic event. The patient knows it has to do with stress which was high in June 2024. The patient lost several dear friends since then. Stress is lessening now. The patient has dull pain into the arms that also shoots R thumb. Weakness is getting worse. She started PT recently and its going well. The patient went to Kathi Vance DO her PCP and the patient was referred here for consult. Pain can at times wake her up but recently its been better. Valerian root helps her sleep. Pain is moderate today but at times when they spasm can be a 10/10. No HA.  12/16- The patient reports more stiff and achiness today more than pain 6/10. But the weekend before she had a lot of spasms do to the physical activities  12/23- Thd patient is achy 5/10 in the neck and mid back and lower back today.  1/10/25- The patient reports overall she is improvements. Still has spasms when she overdoes it.   1/17/25- The patient has increased pain into the arms today. 5/10 in the neck and shoulder, and 3/10 in the mid back.   1/24-The pt 7/10 in the neck and mid back and into the shoulders all are worse today.  2/3- The patient has achiness in the neck and L shoulder. 3/10 pain total    Neck Pain     Shoulder Pain     Back Pain      Past Medical History:   Diagnosis Date   • Seasonal allergies    • Vertigo       The following portions of the patient's history were reviewed and updated as appropriate: allergies, past family history, past medical history, past social history, past surgical history, and problem list.  Review of Systems   Musculoskeletal:  Positive for arthralgias, back pain, myalgias, neck pain and  neck stiffness.     Physical Exam  Constitutional:       Appearance: Normal appearance.   Musculoskeletal:         General: Tenderness present. Normal range of motion.        Arms:       Cervical back: Normal range of motion. Rigidity and tenderness present.   Neurological:      Mental Status: She is alert and oriented to person, place, and time.      Gait: Gait is intact.      Deep Tendon Reflexes: Reflexes are normal and symmetric.   Psychiatric:         Attention and Perception: Attention normal.         Mood and Affect: Affect normal.         Speech: Speech normal.         Behavior: Behavior is cooperative.         Cognition and Memory: Cognition normal.       SOFT TISSUE ASSESSMENT: Hypertonicity and tenderness palpated C5-L1 erector spinae, upper/middle traps, lev scap, SCM, scalene, rhomboid, suboccipitals JOINT RECTRICTIONS: C5-T6, T10-L1 ORTHO: Tatyana unremarkable for centralization/peripheralization; max foraminal comp elicits local np R/L; shoulder depression elicits stiffness in R/L upper trap; brachial plexus tension test elicits neural tension in R/L UE; cervical distraction elicits relieves CC    Return in about 1 week (around 2/17/2025) for Recheck.

## 2025-02-12 ENCOUNTER — APPOINTMENT (OUTPATIENT)
Dept: PHYSICAL THERAPY | Facility: CLINIC | Age: 70
End: 2025-02-12
Payer: COMMERCIAL

## 2025-02-17 ENCOUNTER — PROCEDURE VISIT (OUTPATIENT)
Age: 70
End: 2025-02-17
Payer: COMMERCIAL

## 2025-02-17 VITALS — WEIGHT: 134 LBS | BODY MASS INDEX: 24.66 KG/M2 | HEIGHT: 62 IN

## 2025-02-17 DIAGNOSIS — M47.812 CERVICAL SPONDYLOSIS: ICD-10-CM

## 2025-02-17 DIAGNOSIS — M99.03 SEGMENTAL DYSFUNCTION OF LUMBAR REGION: ICD-10-CM

## 2025-02-17 DIAGNOSIS — M99.02 SEGMENTAL DYSFUNCTION OF THORACIC REGION: ICD-10-CM

## 2025-02-17 DIAGNOSIS — M99.01 SEGMENTAL DYSFUNCTION OF CERVICAL REGION: ICD-10-CM

## 2025-02-17 DIAGNOSIS — M62.838 NECK MUSCLE SPASM: Primary | ICD-10-CM

## 2025-02-17 PROCEDURE — 98941 CHIROPRACT MANJ 3-4 REGIONS: CPT | Performed by: CHIROPRACTOR

## 2025-02-17 NOTE — PROGRESS NOTES
Diagnoses and all orders for this visit:    Neck muscle spasm    Segmental dysfunction of cervical region    Segmental dysfunction of thoracic region    Segmental dysfunction of lumbar region    Cervical spondylosis     DATE OF FIRST VISIT: 11/22/24    ASSESSMENT:   The symptoms and exam findings consistent with spondylosis of cervical spine secondary to repetitive st/sp injury, exacerbated by postural/ergonomic stressors. Pt responded well to stretches and manual mobilization of the affected spinal and myofascial tissues with increased ROM; trial of conservative tx recommended consisting of stretching, ther-ex, graded mobilization/manipulation of the affected spinal/myofascial tissues, postural/ergonomic education and take home stretches/exercises. If symptoms fail to improve with short trial of conservative care, appropriate imaging and referral will be coordinated.  - Tolerated treatment well with decrease in tightness and pain.    PROCEDURE CODES: [id number]    TREATMENT:  Fear avoidance behavior discussion, encouraged and reassured pt that natural course of condition is to improve over time with adherence to tx plan and home care strategies. Home care recommendations: avoid bed rest, walk (but avoid trails and uneven surfaces), gradual return to activity to tolerance (avoid anything that peripheralizes symptoms), ice 20 min on/off, watch for ice burn, call if symptoms peripheralize, worsen, or neurologic deficit progresses. Ther-ex: IASTM - discussed post procedure soreness and/or ecchymosis for up to 36 hrs, applied to affected mm hypertonicities; wall kandi, axial retraction, upper trap stretch, lev scap stretch, SCM stretch, lat rows with t-band, lat pull down with t-band, abdominal bracing; greater than 15 min spent performing above mentioned ther-ex. Thoracic mobilization/manipulation: prone P-A mob; cervical mobilization/manipulation: diversified supine graded mobilization, cervical traction, prone C/T jct  HVLA    HPI:  Neema Leger is a 69 y.o. female   Chief Complaint   Patient presents with   • Neck Pain     Neck pain-3   • Shoulder Pain     Shoulder pain-3  Both shoulders      • Back Pain     Middle back pain-3     The patient presents to the office with increase pain in the mid back and into the neck that started on Labor day without traumatic event. The patient knows it has to do with stress which was high in June 2024. The patient lost several dear friends since then. Stress is lessening now. The patient has dull pain into the arms that also shoots R thumb. Weakness is getting worse. She started PT recently and its going well. The patient went to Kathi Vance DO her PCP and the patient was referred here for consult. Pain can at times wake her up but recently its been better. Valerian root helps her sleep. Pain is moderate today but at times when they spasm can be a 10/10. No HA.  12/16- The patient reports more stiff and achiness today more than pain 6/10. But the weekend before she had a lot of spasms do to the physical activities  12/23- Thd patient is achy 5/10 in the neck and mid back and lower back today.  1/10/25- The patient reports overall she is improvements. Still has spasms when she overdoes it.   1/17/25- The patient has increased pain into the arms today. 5/10 in the neck and shoulder, and 3/10 in the mid back.   1/24-The pt 7/10 in the neck and mid back and into the shoulders all are worse today.  2/3- The patient has achiness in the neck and L shoulder. 3/10 pain total  - Shoulders and mi back are all 3/10, she has the most pain still at night but mentions the she has improvements with rubbing it like I do in her appts.    Neck Pain     Shoulder Pain     Back Pain      Past Medical History:   Diagnosis Date   • Seasonal allergies    • Vertigo       The following portions of the patient's history were reviewed and updated as appropriate: allergies, past family history, past medical history, past  social history, past surgical history, and problem list.  Review of Systems   Musculoskeletal:  Positive for arthralgias, back pain, myalgias, neck pain and neck stiffness.     Physical Exam  Constitutional:       Appearance: Normal appearance.   Musculoskeletal:         General: Tenderness present. Normal range of motion.        Arms:       Cervical back: Normal range of motion. Rigidity and tenderness present.   Neurological:      Mental Status: She is alert and oriented to person, place, and time.      Gait: Gait is intact.      Deep Tendon Reflexes: Reflexes are normal and symmetric.   Psychiatric:         Attention and Perception: Attention normal.         Mood and Affect: Affect normal.         Speech: Speech normal.         Behavior: Behavior is cooperative.         Cognition and Memory: Cognition normal.       SOFT TISSUE ASSESSMENT: Hypertonicity and tenderness palpated C5-L1 erector spinae, upper/middle traps, lev scap, SCM, scalene, rhomboid, suboccipitals JOINT RECTRICTIONS: C5-T6, T10-L1 ORTHO: Tatyana unremarkable for centralization/peripheralization; max foraminal comp elicits local np R/L; shoulder depression elicits stiffness in R/L upper trap; brachial plexus tension test elicits neural tension in R/L UE; cervical distraction elicits relieves CC    Return in about 1 week (around 2/24/2025) for Recheck.

## 2025-02-18 ENCOUNTER — TELEPHONE (OUTPATIENT)
Age: 70
End: 2025-02-18

## 2025-02-18 DIAGNOSIS — M25.519 ACUTE SHOULDER PAIN, UNSPECIFIED LATERALITY: Primary | ICD-10-CM

## 2025-02-18 NOTE — TELEPHONE ENCOUNTER
Patient made appointment with   River Castillo MD For 2/25/25.  [facility] [place], [address], [facility] [address] [place], [place], [address] [phone]    Patient is asking can she go there or does she need to see dr. Dr Rodriguez's. Patient would rather stay in [place] and not drive to [place]. Patient can not sleep well and that is why she is asking.  Please call to advise accordingly.

## 2025-02-18 NOTE — TELEPHONE ENCOUNTER
Pt called with left shoulder pain, she is currently in PT and seeing a chiropractor for. Pt is inquiring about a Cortizone injection and asking if Dr Vance does it or can she refer pt out? Please advise Pt. Thank you

## 2025-02-19 ENCOUNTER — OFFICE VISIT (OUTPATIENT)
Dept: PHYSICAL THERAPY | Facility: CLINIC | Age: 70
End: 2025-02-19
Payer: COMMERCIAL

## 2025-02-19 DIAGNOSIS — M62.838 NECK MUSCLE SPASM: Primary | ICD-10-CM

## 2025-02-19 PROCEDURE — 97112 NEUROMUSCULAR REEDUCATION: CPT

## 2025-02-19 PROCEDURE — 97140 MANUAL THERAPY 1/> REGIONS: CPT

## 2025-02-19 PROCEDURE — 97110 THERAPEUTIC EXERCISES: CPT

## 2025-02-19 NOTE — PROGRESS NOTES
"Daily Note      Today's date: 2025  Patient name: Neema Leger  : 1955  MRN: [id number]  Referring provider: Kathi Vance DO  Dx:   Encounter Diagnosis     ICD-10-CM    1. Neck muscle spasm  M62.838             Subjective: Neema reports she is seeing improvement in neck pain, \"the stretches really help\". Notes she has been experiencing L shoulder pain, limiting daily activities and sleep. Plans to pursue injection in the up coming weeks.        Objective: See treatment diary below    Assessment: Neema tolerated PT treatment well. Continued with STM to cervical musculature to address tissue tone, pt favoring. Passive stretching performed, ROM limited with lateral flexion and rotation. Was able to complete therapeutic exercises without pain, fatigues fairly quickly with periscapular strengthening. Pt would benefit from continued PT to further address impairments and maximize functional level.       Plan: Continue per plan of care.           POC Expires Reeval for Medicare to be completed Unit Limit Auth Status Auth Expiration Date PT/OT/STVisit Limit FOTO   25 By visit N/A 6 units Approved 25 N/A     Completed on visit N/A                    Precautions: Standard    Auth Status 25     Approved  Visit # 1 2 3 4     Visits Remaining                  Manuals         STM/MFR B/L UT L > R, cervical paraspinals B/L UT L > R, cervical paraspinals B/L UT L > R, cervical paraspinals B/L UT L > R, cervical paraspinals     Manual flexibility         Joint mobs cervical Cervical distraction w/ SOR Cervical distraction w/ SOR Cervical distraction w/ SOR Cervical distraction w/ SOR              Ther Ex         Cervical AROM         Seated thoracic extension 2x10 (5s) over 1/2 FR 2x10 (5s) over 1/2 FR 2x10 (5s) over 1/2 FR  2x10 (5s) over 1/2 FR      Cervical SNAG extension 2x10 (5s)  2x10 (5s)  2x10 (5s)  2x10 :05      Open book 10x5s 10x5s 10x5s  10x5s      Nerve glides       "                                                Neuro Re-Ed         Cervical retractions 10x seated 10x seated 10x seated 10x      Chin tucks 2x10 (3s)  Supine 10x3s  2x10 (3s) supine 2x10 (3s) supine     Scap retractions  15x5s  2x10 (5s) 2x10 (5s)     Rows 2x10 green TB  2x6 OTB 2x10 OTB      Shoulder extensions 2x10 green TB  2x6 OTB 2x10 OTB     B/L ER          Towel slides w/ ER iso                                    Ther Activity         Pt education                                      Access Code: [email or web address]  URL: [email or web address]  Date: 11/14/2024  Prepared by: Madan Alegre    Exercises  - Seated Cervical Retraction  - 2 x daily - 7 x weekly - 2 sets - 10 reps - 3 hold  - Seated Thoracic Extension with Hands Supporting Neck  - 2 x daily - 7 x weekly - 2 sets - 10 reps - 5 hold  - Cervical Extension AROM with Strap  - 2 x daily - 7 x weekly - 2 sets - 10 reps - 3 hold

## 2025-02-24 ENCOUNTER — PROCEDURE VISIT (OUTPATIENT)
Age: 70
End: 2025-02-24
Payer: COMMERCIAL

## 2025-02-24 VITALS — WEIGHT: 134 LBS | HEIGHT: 62 IN | BODY MASS INDEX: 24.66 KG/M2

## 2025-02-24 DIAGNOSIS — M99.01 SEGMENTAL DYSFUNCTION OF CERVICAL REGION: ICD-10-CM

## 2025-02-24 DIAGNOSIS — M99.02 SEGMENTAL DYSFUNCTION OF THORACIC REGION: ICD-10-CM

## 2025-02-24 DIAGNOSIS — M47.812 CERVICAL SPONDYLOSIS: ICD-10-CM

## 2025-02-24 DIAGNOSIS — M62.838 NECK MUSCLE SPASM: Primary | ICD-10-CM

## 2025-02-24 DIAGNOSIS — M99.03 SEGMENTAL DYSFUNCTION OF LUMBAR REGION: ICD-10-CM

## 2025-02-24 PROCEDURE — 98941 CHIROPRACT MANJ 3-4 REGIONS: CPT | Performed by: CHIROPRACTOR

## 2025-02-24 NOTE — PROGRESS NOTES
Diagnoses and all orders for this visit:    Neck muscle spasm    Segmental dysfunction of cervical region    Segmental dysfunction of thoracic region    Segmental dysfunction of lumbar region    Cervical spondylosis     DATE OF FIRST VISIT: 11/22/24    ASSESSMENT:   The symptoms and exam findings consistent with spondylosis of cervical spine secondary to repetitive st/sp injury, exacerbated by postural/ergonomic stressors. Pt responded well to stretches and manual mobilization of the affected spinal and myofascial tissues with increased ROM; trial of conservative tx recommended consisting of stretching, ther-ex, graded mobilization/manipulation of the affected spinal/myofascial tissues, postural/ergonomic education and take home stretches/exercises. If symptoms fail to improve with short trial of conservative care, appropriate imaging and referral will be coordinated.  - Tolerated treatment well with decrease in tightness and pain.    PROCEDURE CODES: 97526-OW    TREATMENT:  Fear avoidance behavior discussion, encouraged and reassured pt that natural course of condition is to improve over time with adherence to tx plan and home care strategies. Home care recommendations: avoid bed rest, walk (but avoid trails and uneven surfaces), gradual return to activity to tolerance (avoid anything that peripheralizes symptoms), ice 20 min on/off, watch for ice burn, call if symptoms peripheralize, worsen, or neurologic deficit progresses. Ther-ex: IASTM - discussed post procedure soreness and/or ecchymosis for up to 36 hrs, applied to affected mm hypertonicities; wall kandi, axial retraction, upper trap stretch, lev scap stretch, SCM stretch, lat rows with t-band, lat pull down with t-band, abdominal bracing; greater than 15 min spent performing above mentioned ther-ex. Thoracic mobilization/manipulation: prone P-A mob; cervical mobilization/manipulation: diversified supine graded mobilization, cervical traction, prone C/T jct  HVLA    HPI:  Neema Leger is a 69 y.o. female   Chief Complaint   Patient presents with   • Neck Pain     Neck pain-2   • Shoulder Pain     Shoulder pain-5   • Back Pain     Middle back pain-2     The patient presents to the office with increase pain in the mid back and into the neck that started on Labor day without traumatic event. The patient knows it has to do with stress which was high in June 2024. The patient lost several dear friends since then. Stress is lessening now. The patient has dull pain into the arms that also shoots R thumb. Weakness is getting worse. She started PT recently and its going well. The patient went to Kathi Vance DO her PCP and the patient was referred here for consult. Pain can at times wake her up but recently its been better. Valerian root helps her sleep. Pain is moderate today but at times when they spasm can be a 10/10. No HA.  12/16- The patient reports more stiff and achiness today more than pain 6/10. But the weekend before she had a lot of spasms do to the physical activities  12/23- Thd patient is achy 5/10 in the neck and mid back and lower back today.  1/10/25- The patient reports overall she is improvements. Still has spasms when she overdoes it.   1/17/25- The patient has increased pain into the arms today. 5/10 in the neck and shoulder, and 3/10 in the mid back.   1/24-The pt 7/10 in the neck and mid back and into the shoulders all are worse today.  2/3- The patient has achiness in the neck and L shoulder. 3/10 pain total  - Shoulders and mi back are all 3/10, she has the most pain still at night but mentions the she has improvements with rubbing it like I do in her appts.  2/24/25- The patient reports she overall had a good week,     Neck Pain     Shoulder Pain     Back Pain    Past Medical History:   Diagnosis Date   • Seasonal allergies    • Vertigo       The following portions of the patient's history were reviewed and updated as appropriate: allergies, past family  history, past medical history, past social history, past surgical history, and problem list.  Review of Systems   Musculoskeletal:  Positive for arthralgias, back pain, myalgias, neck pain and neck stiffness.     Physical Exam  Constitutional:       Appearance: Normal appearance.   Musculoskeletal:         General: Tenderness present. Normal range of motion.        Arms:       Cervical back: Normal range of motion. Rigidity and tenderness present.   Neurological:      Mental Status: She is alert and oriented to person, place, and time.      Gait: Gait is intact.      Deep Tendon Reflexes: Reflexes are normal and symmetric.   Psychiatric:         Attention and Perception: Attention normal.         Mood and Affect: Affect normal.         Speech: Speech normal.         Behavior: Behavior is cooperative.         Cognition and Memory: Cognition normal.     SOFT TISSUE ASSESSMENT: Hypertonicity and tenderness palpated C5-L1 erector spinae, upper/middle traps, lev scap, SCM, scalene, rhomboid, suboccipitals JOINT RECTRICTIONS: C5-T6, T10-L1 ORTHO: Tatyana unremarkable for centralization/peripheralization; max foraminal comp elicits local np R/L; shoulder depression elicits stiffness in R/L upper trap; brachial plexus tension test elicits neural tension in R/L UE; cervical distraction elicits relieves CC    Return in about 1 week (around 3/3/2025) for Recheck.

## 2025-02-25 ENCOUNTER — APPOINTMENT (OUTPATIENT)
Dept: RADIOLOGY | Facility: CLINIC | Age: 70
End: 2025-02-25
Payer: COMMERCIAL

## 2025-02-25 ENCOUNTER — OFFICE VISIT (OUTPATIENT)
Dept: OBGYN CLINIC | Facility: CLINIC | Age: 70
End: 2025-02-25
Payer: COMMERCIAL

## 2025-02-25 VITALS — BODY MASS INDEX: 25.21 KG/M2 | WEIGHT: 137 LBS | HEIGHT: 62 IN

## 2025-02-25 DIAGNOSIS — M25.512 CHRONIC LEFT SHOULDER PAIN: ICD-10-CM

## 2025-02-25 DIAGNOSIS — G89.29 CHRONIC LEFT SHOULDER PAIN: ICD-10-CM

## 2025-02-25 DIAGNOSIS — M47.22 CERVICAL SPONDYLOSIS WITH RADICULOPATHY: Primary | ICD-10-CM

## 2025-02-25 DIAGNOSIS — M54.2 NECK PAIN: ICD-10-CM

## 2025-02-25 PROBLEM — M47.812 CERVICAL SPONDYLOSIS: Status: ACTIVE | Noted: 2025-02-25

## 2025-02-25 PROBLEM — M54.12 RADICULOPATHY, CERVICAL REGION: Status: ACTIVE | Noted: 2025-02-25

## 2025-02-25 PROCEDURE — 72050 X-RAY EXAM NECK SPINE 4/5VWS: CPT

## 2025-02-25 PROCEDURE — 99203 OFFICE O/P NEW LOW 30 MIN: CPT | Performed by: ORTHOPAEDIC SURGERY

## 2025-02-25 PROCEDURE — 73030 X-RAY EXAM OF SHOULDER: CPT

## 2025-02-25 NOTE — PROGRESS NOTES
Assessment:     1. Cervical spondylosis with radiculopathy    2. Chronic left shoulder pain        Plan:     Problem List Items Addressed This Visit          Nervous and Auditory    Cervical spondylosis with radiculopathy - Primary    Findings consistent with severe cervical spine spondylosis with radiculopathy.  Findings and treatment options were discussed with the patient.  X-rays were reviewed with her.  Physical exam findings reveal no pathology in the left shoulder.  Symptoms are coming from the cervical spine and most likely nerve root irritation.  Recommend consultation with spine and pain for further treatment recommendations.  She can continue chiropractor and physical therapy treatment in the meantime.  Follow-up as needed if any problems arise.  All patient's questions were answered to her satisfaction.  This note is created using dictation transcription.  It may contain typographical errors, grammatical errors, improperly dictated words, background noise and other errors.         Relevant Orders    XR spine cervical complete 4 or 5 vw non injury (Completed)    Ambulatory referral to Spine & Pain Management       Surgery/Wound/Pain    Chronic left shoulder pain    Relevant Orders    XR shoulder 2+ vw left      Subjective:     Patient ID: Neema Leger is a 69 y.o. female.  Chief Complaint:  This is a 69-year-old white female here for evaluation of her left shoulder.  Referred by PCP Dr. Kathi Vance DO.  Patient states she has been having cervical spine pain that radiates into the left arm for the past 6 years.  She feels pain, numbness and tingling that radiates down to her fingers.  She also has radiating pain down the right arm, but not as severe.  She has recently been going to physical therapy and chiropractor treatment.  She does feel some improvement in the right arm pain with that treatment.  She has no pain in the left shoulder with daily activities and active range of motion.  No pain with  overhead activities.  She does complain of throbbing/aching pain in the left shoulder when sleeping.  No prior treatment to the left shoulder including surgery or cortisone injections.  No prior treatment to the cervical spine as well besides physical therapy and chiropractor treatment.    Allergy:  Allergies   Allergen Reactions    Codeine Other (See Comments)     Reaction Date: 20Feb2012;     Medications:  all current active meds have been reviewed  Past Medical History:  Past Medical History:   Diagnosis Date    Seasonal allergies     Vertigo      Past Surgical History:  Past Surgical History:   Procedure Laterality Date    DILATION AND CURETTAGE OF UTERUS      DILATION AND CURETTAGE OF UTERUS      SAB    TONSILECTOMY AND ADNOIDECTOMY      TUBAL LIGATION      VEIN SURGERY Right     Leg     Family History:  Family History   Problem Relation Age of Onset    Breast cancer Mother         70's    Heart block Father     Breast cancer Sister 60    Diabetes Sister     Cancer Sister     No Known Problems Sister     Lung cancer Sister 72    No Known Problems Maternal Grandmother     No Known Problems Maternal Grandfather     No Known Problems Paternal Grandmother     No Known Problems Paternal Grandfather     No Known Problems Maternal Aunt     No Known Problems Maternal Aunt     No Known Problems Maternal Aunt     No Known Problems Paternal Aunt      Social History:  Social History     Substance and Sexual Activity   Alcohol Use Not Currently     Social History     Substance and Sexual Activity   Drug Use Never     Social History     Tobacco Use   Smoking Status Some Days    Current packs/day: 0.30    Types: Cigarettes   Smokeless Tobacco Never     Review of Systems   Constitutional: Negative.    HENT: Negative.     Eyes: Negative.    Respiratory: Negative.     Cardiovascular: Negative.    Gastrointestinal: Negative.    Endocrine: Negative.    Genitourinary: Negative.    Musculoskeletal:  Positive for arthralgias (left  "shoulder), neck pain and neck stiffness.   Skin: Negative.    Allergic/Immunologic: Negative.    Neurological:  Positive for weakness and numbness.   Hematological: Negative.    Psychiatric/Behavioral: Negative.           Objective:  BP Readings from Last 1 Encounters:   12/16/24 124/66      Wt Readings from Last 1 Encounters:   02/25/25 62.1 kg (137 lb)      BMI:   Estimated body mass index is 25.06 kg/m² as calculated from the following:    Height as of this encounter: 5' 2\" (1.575 m).    Weight as of this encounter: 62.1 kg (137 lb).  BSA:   Estimated body surface area is 1.63 meters squared as calculated from the following:    Height as of this encounter: 5' 2\" (1.575 m).    Weight as of this encounter: 62.1 kg (137 lb).   Physical Exam  Vitals and nursing note reviewed.   Constitutional:       General: She is not in acute distress.     Appearance: Normal appearance. She is well-developed.   HENT:      Head: Normocephalic and atraumatic.      Right Ear: External ear normal.      Left Ear: External ear normal.   Eyes:      Extraocular Movements: Extraocular movements intact.      Conjunctiva/sclera: Conjunctivae normal.   Pulmonary:      Effort: Pulmonary effort is normal. No respiratory distress.   Musculoskeletal:      Cervical back: Neck supple.   Skin:     General: Skin is warm and dry.   Neurological:      Mental Status: She is alert and oriented to person, place, and time.      Deep Tendon Reflexes: Reflexes are normal and symmetric.   Psychiatric:         Mood and Affect: Mood normal.         Behavior: Behavior normal.       Back Exam     Tenderness   The patient is experiencing tenderness in the cervical.    Range of Motion   Extension:  abnormal   Flexion:  abnormal     Other   Sensation: normal      Left Shoulder Exam     Tenderness   The patient is experiencing no tenderness.     Range of Motion   The patient has normal left shoulder ROM.    Muscle Strength   The patient has normal left shoulder " strength.  Abduction: 5/5   Internal rotation: 5/5   External rotation: 5/5   Biceps: 5/5     Tests   Metzger test: negative  Cross arm: negative  Impingement: negative  Drop arm: negative    Other   Erythema: absent  Scars: absent  Sensation: normal  Pulse: present     Comments:  Negative speeds  Negative empty can  No pain with resisted strength testing            I have personally reviewed pertinent films in PACS and my interpretation is  cervical spine reveal multilevel severe spondylosis.  X-rays of the left shoulder reveal mild glenohumeral osteoarthritis.  No soft tissue calcification.    Scribe Attestation      I,:  Dania Lozano PA-C am acting as a scribe while in the presence of the attending physician.:       I,:  River Castillo MD personally performed the services described in this documentation    as scribed in my presence.:

## 2025-02-25 NOTE — ASSESSMENT & PLAN NOTE
Findings consistent with severe cervical spine spondylosis with radiculopathy.  Findings and treatment options were discussed with the patient.  X-rays were reviewed with her.  Physical exam findings reveal no pathology in the left shoulder.  Symptoms are coming from the cervical spine and most likely nerve root irritation.  Recommend consultation with spine and pain for further treatment recommendations.  She can continue chiropractor and physical therapy treatment in the meantime.  Follow-up as needed if any problems arise.  All patient's questions were answered to her satisfaction.  This note is created using dictation transcription.  It may contain typographical errors, grammatical errors, improperly dictated words, background noise and other errors.

## 2025-02-26 ENCOUNTER — OFFICE VISIT (OUTPATIENT)
Dept: PHYSICAL THERAPY | Facility: CLINIC | Age: 70
End: 2025-02-26
Payer: COMMERCIAL

## 2025-02-26 DIAGNOSIS — M62.838 NECK MUSCLE SPASM: Primary | ICD-10-CM

## 2025-02-26 PROCEDURE — 97140 MANUAL THERAPY 1/> REGIONS: CPT

## 2025-02-26 PROCEDURE — 97112 NEUROMUSCULAR REEDUCATION: CPT

## 2025-02-26 PROCEDURE — 97110 THERAPEUTIC EXERCISES: CPT

## 2025-02-26 NOTE — PROGRESS NOTES
Daily Note      Today's date: 2025  Patient name: Neema Leger  : 1955  MRN: [id number]  Referring provider: Kathi Vance DO  Dx:   Encounter Diagnosis     ICD-10-CM    1. Neck muscle spasm  M62.838           Subjective: Pt reports that her neck is getting better, but has still had issues with her left shoulder. Pt states that she saw an orthopedist for her shoulder and there were no abnormalities on imaging and the pain was attributed to her neck. Pt states she was referred to spine and pain. Pt states that chiropractic and home exercises are still helping.        Objective: See treatment diary below    Assessment: Pt tolerated treatment well.  Pt presents with overall improvement in tissue tension of cervical paraspinals and UT/LS bilaterally. Continues to present with mobility deficits with sidebending and rotation. Plan to discuss D/C planning next visit after spine and pain management plan is established.     Plan: Continue per plan of care.           POC Expires Reeval for Medicare to be completed Unit Limit Auth Status Auth Expiration Date PT/OT/STVisit Limit FOTO   25 By visit N/A 6 units Approved 25 N/A     Completed on visit N/A                    Precautions: Standard    Auth Status 25    Approved  Visit # 1 2 3 4 5    Visits Remaining                  Manuals         STM/MFR B/L UT L > R, cervical paraspinals B/L UT L > R, cervical paraspinals B/L UT L > R, cervical paraspinals B/L UT L > R, cervical paraspinals B/L UT, cervical paraspinals    Manual flexibility         Joint mobs cervical Cervical distraction w/ SOR Cervical distraction w/ SOR Cervical distraction w/ SOR Cervical distraction w/ SOR Cervical distraction w/ SOR             Ther Ex         Cervical AROM         Seated thoracic extension 2x10 (5s) over 1/2 FR 2x10 (5s) over 1/2 FR 2x10 (5s) over 1/2 FR  2x10 (5s) over 1/2 FR  2x10 (5s) over 1/2 FR    Cervical SNAG extension 2x10  (5s)  2x10 (5s)  2x10 (5s)  2x10 :05  2x10 (5s)     Open book 10x5s 10x5s 10x5s  10x5s  10x5s B/L     Nerve glides                                                      Neuro Re-Ed         Cervical retractions 10x seated 10x seated 10x seated 10x      Chin tucks 2x10 (3s)  Supine 10x3s  2x10 (3s) supine 2x10 (3s) supine 2x10 (3s) supine    Scap retractions  15x5s  2x10 (5s) 2x10 (5s) 2x10 (5s)     Rows 2x10 green TB  2x6 OTB 2x10 OTB  2x10 OTB    Shoulder extensions 2x10 green TB  2x6 OTB 2x10 OTB 2x10 OTB    B/L ER          Towel slides w/ ER iso                                    Ther Activity         Pt education                                      Access Code: [email or web address]  URL: [email or web address]  Date: 11/14/2024  Prepared by: Madan Aelgre    Exercises  - Seated Cervical Retraction  - 2 x daily - 7 x weekly - 2 sets - 10 reps - 3 hold  - Seated Thoracic Extension with Hands Supporting Neck  - 2 x daily - 7 x weekly - 2 sets - 10 reps - 5 hold  - Cervical Extension AROM with Strap  - 2 x daily - 7 x weekly - 2 sets - 10 reps - 3 hold

## 2025-03-03 ENCOUNTER — PROCEDURE VISIT (OUTPATIENT)
Age: 70
End: 2025-03-03
Payer: COMMERCIAL

## 2025-03-03 VITALS — HEIGHT: 62 IN | BODY MASS INDEX: 25.21 KG/M2 | WEIGHT: 137 LBS

## 2025-03-03 DIAGNOSIS — M99.03 SEGMENTAL DYSFUNCTION OF LUMBAR REGION: ICD-10-CM

## 2025-03-03 DIAGNOSIS — M99.01 SEGMENTAL DYSFUNCTION OF CERVICAL REGION: ICD-10-CM

## 2025-03-03 DIAGNOSIS — M62.838 NECK MUSCLE SPASM: Primary | ICD-10-CM

## 2025-03-03 DIAGNOSIS — M99.02 SEGMENTAL DYSFUNCTION OF THORACIC REGION: ICD-10-CM

## 2025-03-03 DIAGNOSIS — M47.812 CERVICAL SPONDYLOSIS: ICD-10-CM

## 2025-03-03 PROCEDURE — 98941 CHIROPRACT MANJ 3-4 REGIONS: CPT | Performed by: CHIROPRACTOR

## 2025-03-03 NOTE — PROGRESS NOTES
Diagnoses and all orders for this visit:    Neck muscle spasm    Segmental dysfunction of cervical region    Segmental dysfunction of thoracic region    Segmental dysfunction of lumbar region    Cervical spondylosis     DATE OF FIRST VISIT: 11/22/24    ASSESSMENT:   The symptoms and exam findings consistent with spondylosis of cervical spine secondary to repetitive st/sp injury, exacerbated by postural/ergonomic stressors. Pt responded well to stretches and manual mobilization of the affected spinal and myofascial tissues with increased ROM; trial of conservative tx recommended consisting of stretching, ther-ex, graded mobilization/manipulation of the affected spinal/myofascial tissues, postural/ergonomic education and take home stretches/exercises. If symptoms fail to improve with short trial of conservative care, appropriate imaging and referral will be coordinated.  - Tolerated treatment well with decrease in tightness and pain.    PROCEDURE CODES: 39836-EF    TREATMENT:  Fear avoidance behavior discussion, encouraged and reassured pt that natural course of condition is to improve over time with adherence to tx plan and home care strategies. Home care recommendations: avoid bed rest, walk (but avoid trails and uneven surfaces), gradual return to activity to tolerance (avoid anything that peripheralizes symptoms), ice 20 min on/off, watch for ice burn, call if symptoms peripheralize, worsen, or neurologic deficit progresses. Ther-ex: IASTM - discussed post procedure soreness and/or ecchymosis for up to 36 hrs, applied to affected mm hypertonicities; wall kandi, axial retraction, upper trap stretch, lev scap stretch, SCM stretch, lat rows with t-band, lat pull down with t-band, abdominal bracing; greater than 15 min spent performing above mentioned ther-ex. Thoracic mobilization/manipulation: prone P-A mob; cervical mobilization/manipulation: diversified supine graded mobilization, cervical traction, prone C/T jct  HVLA    HPI:  Neema Leger is a 69 y.o. female   Chief Complaint   Patient presents with   • Neck Pain     Neck pain-5   • Shoulder Pain     Shoulder pain-5   • Back Pain     Middle back pain-5     The patient presents to the office with increase pain in the mid back and into the neck that started on Labor day without traumatic event. The patient knows it has to do with stress which was high in June 2024. The patient lost several dear friends since then. Stress is lessening now. The patient has dull pain into the arms that also shoots R thumb. Weakness is getting worse. She started PT recently and its going well. The patient went to Kathi Vance DO her PCP and the patient was referred here for consult. Pain can at times wake her up but recently its been better. Valerian root helps her sleep. Pain is moderate today but at times when they spasm can be a 10/10. No HA.  12/16- The patient reports more stiff and achiness today more than pain 6/10. But the weekend before she had a lot of spasms do to the physical activities  12/23- Thd patient is achy 5/10 in the neck and mid back and lower back today.  1/10/25- The patient reports overall she is improvements. Still has spasms when she overdoes it.   1/17/25- The patient has increased pain into the arms today. 5/10 in the neck and shoulder, and 3/10 in the mid back.   1/24-The pt 7/10 in the neck and mid back and into the shoulders all are worse today.  2/3- The patient has achiness in the neck and L shoulder. 3/10 pain total  - Shoulders and mi back are all 3/10, she has the most pain still at night but mentions the she has improvements with rubbing it like I do in her appts.  2/24/25- The patient reports she overall had a good week,   3/3/25- The patient is feeling stiff and sore 5/10    Neck Pain     Shoulder Pain     Back Pain      Past Medical History:   Diagnosis Date   • Seasonal allergies    • Vertigo       The following portions of the patient's history were  reviewed and updated as appropriate: allergies, past family history, past medical history, past social history, past surgical history, and problem list.  Review of Systems   Musculoskeletal:  Positive for arthralgias, back pain, myalgias, neck pain and neck stiffness.     Physical Exam  Constitutional:       Appearance: Normal appearance.   Musculoskeletal:         General: Tenderness present. Normal range of motion.        Arms:       Cervical back: Normal range of motion. Rigidity and tenderness present.   Neurological:      Mental Status: She is alert and oriented to person, place, and time.      Gait: Gait is intact.      Deep Tendon Reflexes: Reflexes are normal and symmetric.   Psychiatric:         Attention and Perception: Attention normal.         Mood and Affect: Affect normal.         Speech: Speech normal.         Behavior: Behavior is cooperative.         Cognition and Memory: Cognition normal.       SOFT TISSUE ASSESSMENT: Hypertonicity and tenderness palpated C5-L1 erector spinae, upper/middle traps, lev scap, SCM, scalene, rhomboid, suboccipitals JOINT RECTRICTIONS: C5-T6, T10-L1 ORTHO: Tatyana unremarkable for centralization/peripheralization; max foraminal comp elicits local np R/L; shoulder depression elicits stiffness in R/L upper trap; brachial plexus tension test elicits neural tension in R/L UE; cervical distraction elicits relieves CC    Return in about 2 weeks (around 3/17/2025) for Recheck.

## 2025-03-04 ENCOUNTER — CONSULT (OUTPATIENT)
Dept: PAIN MEDICINE | Facility: CLINIC | Age: 70
End: 2025-03-04
Payer: COMMERCIAL

## 2025-03-04 VITALS — BODY MASS INDEX: 25.21 KG/M2 | WEIGHT: 137 LBS | HEIGHT: 62 IN | HEART RATE: 75 BPM | TEMPERATURE: 97.4 F

## 2025-03-04 DIAGNOSIS — M54.12 CERVICAL RADICULOPATHY: Primary | ICD-10-CM

## 2025-03-04 PROCEDURE — G2211 COMPLEX E/M VISIT ADD ON: HCPCS | Performed by: ANESTHESIOLOGY

## 2025-03-04 PROCEDURE — 99204 OFFICE O/P NEW MOD 45 MIN: CPT | Performed by: ANESTHESIOLOGY

## 2025-03-04 RX ORDER — PREDNISONE 10 MG/1
TABLET ORAL
Qty: 36 TABLET | Refills: 0 | Status: SHIPPED | OUTPATIENT
Start: 2025-03-04

## 2025-03-04 NOTE — PROGRESS NOTES
Assessment  1. Cervical radiculopathy - Left        Plan      The patient's pain persists despite time, relative rest, activity modification, and nonsteroidal anti-inflammatories. Her pain is significantly interfering with her daily living activities.  She has been undergoing both physical therapy and chiropractic treatment.  It is appropriate to order an MRI of the cervical spine to evaluate any significant etiology of her symptoms.    I will start her on a titrating dose of oral prednisone to address any inflammatory component of the patient's pain. She understands she should not take nonsteroidal anti-inflammatories until she is finished with this steroid. She understands there is a chance of decreasing immunity secondary to steroids. If she has any problems or questions she will give us a call.    Patient is to contact our office or present to hospital Emergency Room if experience worsening or new upper extremity pain, sensory or motor change, bladder or bowel dysfunction, or other neurological change.    Once we obtain MRI results, I will follow-up with the patient, review the results and current symptoms, and discuss the next steps of the treatment plan.    My impressions and treatment recommendations were discussed in detail with the patient who verbalized understanding and had no further questions.  Discharge instructions were provided. I personally saw and examined the patient and I agree with the above discussed plan of care.  This note is created using dictation transcription.  It may contain typographical errors, grammatical errors, improperly dictated words, background noise and other errors.    Orders Placed This Encounter   Procedures    MRI cervical spine without contrast     Standing Status:   Future     Expected Date:   3/4/2025     Expiration Date:   3/4/2029     Scheduling Instructions:      There is no preparation for this test. Please leave your jewelry and valuables at home, wedding rings are  the exception. All patients will be required to change into a hospital gown and pants.  Street clothes are not permitted in the MRI.  Magnetic nail polish must be removed prior to arrival for your test. Please bring your insurance cards, a form of photo ID and a list of your medications with you. Arrive 15 minutes prior to your appointment time in order to register. Please bring any prior CT or MRI studies of this area that were not performed at a [facility].            To schedule this appointment, please contact Central Scheduling at [phone].            Prior to your appointment, please make sure you complete the MRI Screening Form when you e-Check in for your appointment. This will be available starting 7 days before your appointment in [employer]. You may receive an e-mail with an activation code if you do not have a [employer] account. If you do not have access to a device, we will complete your screening at your appointment.     Reason for Exam:   left cervical radic     What is the patient's sedation requirement?:   No Sedation     Does the patient need medication for Claustrophobia? If yes, order medication at this point.:   No     Does the patient wear a life vest, have an implanted cardiac device, a stimulation device, a sleep apnea stimulator, or a breast tissue expansion device?:   Unknown     Release to patient through [employer]:   Immediate     New Medications Ordered This Visit   Medications    predniSONE 10 mg tablet     Sig: Take according to titration schedule     Dispense:  36 tablet     Refill:  0     Referred By: Dania Lozano PA-C  History of Present Illness    Neema Leger is a 69 y.o. female with greater than 10-year history of neck pain but now worsening neck and left arm pain.  She is unaware of any clear precipitant event denies any trauma or injury.  Pain is moderate rates between 4-5 out of 10 the visual analog scale interfering with her daily living duties.  Is nearly constant  could be shooting with a numbing sensation into her left arm she has subjective weakness of her left upper limb.  She reports that lying down standing and sitting all aggravate her pain while exercising relaxation decrease her symptoms.  Physical therapy and chiropractic treatment provided moderate relief.    I have personally reviewed and/or updated the patient's past medical history, past surgical history, family history, social history, current medications, allergies, and vital signs today.     Review of Systems   Constitutional:  Negative for fever and unexpected weight change.   HENT:  Negative for trouble swallowing.    Eyes:  Negative for visual disturbance.   Respiratory:  Positive for cough and wheezing. Negative for shortness of breath.    Cardiovascular:  Negative for chest pain and palpitations.   Gastrointestinal:  Negative for constipation, diarrhea, nausea and vomiting.   Endocrine: Negative for cold intolerance, heat intolerance and polydipsia.   Genitourinary:  Negative for difficulty urinating and frequency.   Musculoskeletal:  Positive for arthralgias and myalgias. Negative for gait problem and joint swelling.   Skin:  Negative for rash.   Neurological:  Negative for dizziness, seizures, syncope, weakness and headaches.   Hematological:  Does not bruise/bleed easily.   Psychiatric/Behavioral:  Negative for dysphoric mood.    All other systems reviewed and are negative.      Patient Active Problem List   Diagnosis    Vertigo    Cervical spondylosis with radiculopathy    Chronic left shoulder pain       Past Medical History:   Diagnosis Date    Seasonal allergies     Vertigo        Past Surgical History:   Procedure Laterality Date    DILATION AND CURETTAGE OF UTERUS      DILATION AND CURETTAGE OF UTERUS      SAB    TONSILECTOMY AND ADNOIDECTOMY      TUBAL LIGATION      VEIN SURGERY Right     Leg       Family History   Problem Relation Age of Onset    Breast cancer Mother         70's    Heart block  Father     Breast cancer Sister 60    Diabetes Sister     Cancer Sister     No Known Problems Sister     Lung cancer Sister 72    No Known Problems Maternal Grandmother     No Known Problems Maternal Grandfather     No Known Problems Paternal Grandmother     No Known Problems Paternal Grandfather     No Known Problems Maternal Aunt     No Known Problems Maternal Aunt     No Known Problems Maternal Aunt     No Known Problems Paternal Aunt        Social History     Occupational History    Not on file   Tobacco Use    Smoking status: Some Days     Current packs/day: 0.30     Types: Cigarettes    Smokeless tobacco: Never   Vaping Use    Vaping status: Never Used   Substance and Sexual Activity    Alcohol use: Not Currently    Drug use: Never    Sexual activity: Not Currently       Current Outpatient Medications on File Prior to Visit   Medication Sig    Ascorbic Acid (vitamin C) 1000 MG tablet Take 1,000 mg by mouth daily    Magnesium-Potassium 250-100 MG TABS Take by mouth    Multiple Vitamins-Minerals (Energy Booster) PACK Take by mouth Energy 2000 liquid    NON FORMULARY Cell food    NON FORMULARY Thyroid support    NON FORMULARY macuguard    [DISCONTINUED] b complex vitamins capsule Take 1 capsule by mouth daily (Patient not taking: Reported on 10/31/2024)    [DISCONTINUED] betamethasone valerate (VALISONE) 0.1 % cream Apply topically 2 (two) times a day for 14 days (Patient not taking: Reported on 3/3/2025)    [DISCONTINUED] cholecalciferol (VITAMIN D3) 1,000 units tablet Take 5,000 Units by mouth daily (Patient not taking: Reported on 10/31/2024)    [DISCONTINUED] cyclobenzaprine (FLEXERIL) 10 mg tablet Take 1 tablet (10 mg total) by mouth 3 (three) times a day as needed for muscle spasms (Patient not taking: Reported on 11/22/2024)    [DISCONTINUED] meclizine (ANTIVERT) 25 mg tablet Take 1 tablet by mouth (Patient not taking: Reported on 2/3/2025)     No current facility-administered medications on file prior to  "visit.       Allergies   Allergen Reactions    Codeine Other (See Comments)     Reaction Date: 20Feb2012;       Physical Exam    Pulse 75   Temp (!) 97.4 °F (36.3 °C)   Ht 5' 2\" (1.575 m)   Wt 62.1 kg (137 lb)   BMI 25.06 kg/m²     Constitutional: normal, well developed, well nourished, alert, in no distress and non-toxic and no overt pain behavior.  Eyes: anicteric  HEENT: grossly intact  Neck: supple, symmetric, trachea midline and no masses   Pulmonary:even and unlabored  Cardiovascular:No edema or pitting edema present  Skin:Normal without rashes or lesions and well hydrated  Psychiatric:Mood and affect appropriate  Neurologic:Cranial Nerves II-XII grossly intact  Musculoskeletal:normal,  Inspection: Normal station and gait. Normal cervical curves and head posture. Skin intact without erythema. No sensory loss. There is no atrophy.   Palpation: There is mild tenderness in the cervical paraspinals no shoulder tenderness  Motor/Strength: Equal strength in the bilateral upper extremities  Reflexes: equal and symmetric in the upper limbs. Sensation: Sensation intact in the upper limb  Maneuvers: Positive cervical Spurling maneuver the left. Negative Lhermitte's sign.    Imaging  CERVICAL SPINE @ SL 2-25-25     INDICATION:   Cervicalgia.      COMPARISON: None.      VIEWS:  XR SPINE CERVICAL COMPLETE 4 OR 5 VW NON INJURY   Images: 4     FINDINGS:     No fracture.      Straightening normal cervical lordosis with degenerative anterolisthesis C3-4.     Advanced disc height loss C4-5 through C6-7 with multilevel facet and uncovertebral hypertrophy.     Bilateral C3-4 neural foraminal narrowing with bilateral C5-6 and right C4-5 neural foraminal narrowing.     The prevertebral soft tissues are within normal limits.       The lung apices are clear.     IMPRESSION:        No acute osseous abnormality.     Degenerative changes as above.        LEFT SHOULDER @ SL 2-25-25     INDICATION:   Pain in left shoulder. Other " chronic pain.      COMPARISON:  None.     VIEWS:  XR SHOULDER 2+ VW LEFT      FINDINGS:     There is no acute fracture or dislocation.     No significant degenerative changes.     No lytic or blastic osseous lesion.     Soft tissues are unremarkable.     IMPRESSION:        No acute osseous abnormality considering patient's age.  No fracture or significant degenerative joint space changes. Normal alignment.        I have personally reviewed pertinent films in PACS and my interpretation is multilevel facet and uncinate hypertrophy with disc degeneration.

## 2025-03-05 ENCOUNTER — EVALUATION (OUTPATIENT)
Dept: PHYSICAL THERAPY | Facility: CLINIC | Age: 70
End: 2025-03-05
Payer: COMMERCIAL

## 2025-03-05 DIAGNOSIS — M62.838 NECK MUSCLE SPASM: Primary | ICD-10-CM

## 2025-03-05 PROCEDURE — 97140 MANUAL THERAPY 1/> REGIONS: CPT

## 2025-03-05 PROCEDURE — 97110 THERAPEUTIC EXERCISES: CPT

## 2025-03-05 PROCEDURE — 97112 NEUROMUSCULAR REEDUCATION: CPT

## 2025-03-05 NOTE — PROGRESS NOTES
PT Re-Evaluation     Today's date: 3/5/2025  Patient name: Neema Leger  : 1955  MRN: [id number]  Referring provider: Kathi Vance DO  Dx:   Encounter Diagnosis     ICD-10-CM    1. Neck muscle spasm  M62.838                      Assessment  Impairments: abnormal muscle firing, abnormal or restricted ROM, activity intolerance, impaired physical strength, lacks appropriate home exercise program, pain with function, scapular dyskinesis, poor posture  and poor body mechanics    Assessment details: Neema Leger has been seen in hospital-based outpatient PT for 14 visits for chronic neck pain with the referring diagnosis of neck muscle spasm. Patient continues to demonstrate good progress toward short-term and long-term PT goals. Patient presents with the following improvements noted: decreased neck pain, improved cervical/thoracic ROM, improved UE strength, and independence with basic HEP. Patient continues to present with the following impairments: neck pain, referred symptoms in the left arm,  limited cervical/thoracic AROM, and postural dysfunction. Due to these impairments, patient continues to have difficulty performing the following: sleeping, lifting/carrying, pushing/pulling, household chores, self-care activities, and shopping. Patient has been educated in updated plan of care. Patient would benefit from continued skilled physical therapy services to address the above functional limitations and to further progress towards prior level of function and independence with home exercise program.  Understanding of Dx/Px/POC: good     Prognosis: good    Goals  Short Term Goals [3 weeks; target date: 12/15/24]  1. Patient will be independent with initial HEP. - goal met  2. Patient will demonstrate an increase in cervical AROM by 5 degrees in all planes. - goal met  3. Patient will present with moderate restriction in thoracic extension ROM. - goal met    Long Term Goals [6 weeks; target date: 25]  1.  Patient will demonstrate an increase in cervical AROM to WNL in order to promote self-care activities pain-free. - goal met  2. Patient will present with mild restriction in thoracic ROM. - in progress  3. Patient will be able to self-correct posture in the clinic 75% of the time or greater. - in progress  4. Patient will be independent with comprehensive HEP. - in progress    Plan  Patient would benefit from: skilled physical therapy  Planned modality interventions: cryotherapy, electrical stimulation/Russian stimulation, TENS, ultrasound, thermotherapy: hydrocollator packs, traction, high voltage pulsed current: spasm management and high voltage pulsed current: pain management    Planned therapy interventions: flexibility, functional ROM exercises, graded exercise, home exercise program, joint mobilization, manual therapy, neuromuscular re-education, patient education, strengthening, stretching, therapeutic exercise, therapeutic activities, balance/weight bearing training, gait training, abdominal trunk stabilization, self care, postural training, activity modification, ADL retraining, ADL training, balance, behavior modification, body mechanics training, breathing training, therapeutic training, transfer training, graded activity, graded motor, muscle pump exercises, [place] taping and IADL retraining    Frequency: 1x week  Plan of Care beginning date: 3/5/2025  Plan of Care expiration date: 4/19/2025  Treatment plan discussed with: patient      Subjective Evaluation    History of Present Illness  Mechanism of injury: Pt reports that she has had neck problems for most of her life. Pt states that she had previously used chiropractic. Pt states she has undergone more stress since August 2024. Pt states she has had muscle spasms that affect both sides of the neck. Pt states that due to neck pain, she is still able to get things done, but has pain after doing things. Pt reports pain with grocery shopping and has  "difficulty moving. Pt states that she has increased pain after household chores.     (24) Pt reports progress \"a little less than 50%\" since starting PT. Pt states that her pain is not severe as it once was and has less frequent spasms than before. Pt states that recently, her symptoms were more in the right arm compared to left. Pt states it was especially bad during the rainy weather over the past several days. Pt states chiropractic care is also still helping.     (3/5/25) Pt reports significant progress since starting PT. Pt states that over the past few weeks, she has not had neck muscle spasm. Pt states that she still has trouble sleeping every night due to left arm pain. Pt reports she recently established care with spine and pain specialist and an MRI is scheduled for ~3 weeks from now. Pt states she was also prescribed prednisone over the next 8 days, beginning today. Pt states that her left arm is the only thing that is still limited, other than sleeping.   Patient Goals  Patient goals for therapy: increased motion  Patient goal: To have less tightness  Pain  Current pain ratin  At best pain ratin  At worst pain ratin  Location: Posterior neck to shoulders  Quality: dull ache and radiating (Numbness in both arms)  Relieving factors: heat (Exercises)  Aggravating factors: overhead activity and lifting    Social Support  Lives with: significant other    Employment status: not working  Hand dominance: right  Exercise history: 3-4x/week 20 minutes  Life stress: High - recently friends/loved ones passed away      Diagnostic Tests  No diagnostic tests performed  Treatments  Previous treatment: physical therapy, chiropractic and medication      Objective     Postural Observations  Seated posture: fair    Additional Postural Observation Details  Slight forward head, rounded shoulder posture    Palpation   Left   Hypertonic in the cervical paraspinals, levator scapulae and upper trapezius. "   Tenderness of the cervical paraspinals and upper trapezius.     Right   Hypertonic in the cervical paraspinals, levator scapulae and upper trapezius.   Tenderness of the cervical paraspinals and upper trapezius.     Active Range of Motion   Cervical/Thoracic Spine       Cervical    Flexion: 50 degrees   Extension: 45 (Tightness) degrees      Left lateral flexion: 50 degrees      Right lateral flexion: 40 degrees      Left rotation: 70 degrees  Right rotation: 80 degrees       Thoracic    Extension:  Restriction level: moderate    Strength/Myotome Testing   Cervical Spine     Left   Interossei strength (t1): 5  Levator scapulae (C4): 5    Right   Interossei strength (t1): 5  Levator scapulae (C4): 5    Left Shoulder     Planes of Motion   Abduction: 5     Isolated Muscles   Levator scapulae: 5     Right Shoulder     Planes of Motion   Abduction: 5     Isolated Muscles   Levator scapulae: 5     Left Elbow   Flexion: 5  Extension: 5    Right Elbow   Flexion: 5  Extension: 5    Left Wrist/Hand   Thumb extension: 5    Right Wrist/Hand   Thumb extension: 5    Additional Strength Details   strength  Right moderately limited compared to left with handshake (right hand dominant)   dynamometer (DANA position 2)  Right = 31 lbs  Left = 41 lbs                POC Expires Reeval for Medicare to be completed Unit Limit Auth Status Auth Expiration Date PT/OT/STVisit Limit FOTO   4/19/25 By visit N/A 6 units Approved 12/31/25 N/A     Completed on visit N/A                    Precautions: Standard    Auth Status 1/8/25 1/22/25 1/29/25 2/19 2/26 3/5 (RE-EVAL)   Approved  Visit # 1 2 3 4 5 6                     Manuals         STM/MFR B/L UT L > R, cervical paraspinals B/L UT L > R, cervical paraspinals B/L UT L > R, cervical paraspinals B/L UT L > R, cervical paraspinals B/L UT, cervical paraspinals B/L UT, cervical paraspinals   Manual flexibility         Joint mobs cervical Cervical distraction w/ SOR Cervical distraction  w/ SOR Cervical distraction w/ SOR Cervical distraction w/ SOR Cervical distraction w/ SOR Cervical distraction w/ SOR            Ther Ex         Cervical AROM         Seated thoracic extension 2x10 (5s) over 1/2 FR 2x10 (5s) over 1/2 FR 2x10 (5s) over 1/2 FR  2x10 (5s) over 1/2 FR  2x10 (5s) over 1/2 FR 2x10 (5s) over 1/2 FR   Cervical SNAG extension 2x10 (5s)  2x10 (5s)  2x10 (5s)  2x10 :05  2x10 (5s)  2x10 (5s)    Open book 10x5s 10x5s 10x5s  10x5s  10x5s B/L  10x5s B/L    Nerve glides                                                      Neuro Re-Ed         Cervical retractions 10x seated 10x seated 10x seated 10x      Chin tucks 2x10 (3s)  Supine 10x3s  2x10 (3s) supine 2x10 (3s) supine 2x10 (3s) supine 2x10 (3s) supine   Scap retractions  15x5s  2x10 (5s) 2x10 (5s) 2x10 (5s)  2x10 95s)    Rows 2x10 green TB  2x6 OTB 2x10 OTB  2x10 OTB 2x10 OTB   Shoulder extensions 2x10 green TB  2x6 OTB 2x10 OTB 2x10 OTB 2x10 OTB   B/L ER          Towel slides w/ ER iso                                    Ther Activity         Pt education                                      Access Code: [email or web address]  URL: [email or web address]  Date: 11/14/2024  Prepared by: Madan Alegre    Exercises  - Seated Cervical Retraction  - 2 x daily - 7 x weekly - 2 sets - 10 reps - 3 hold  - Seated Thoracic Extension with Hands Supporting Neck  - 2 x daily - 7 x weekly - 2 sets - 10 reps - 5 hold  - Cervical Extension AROM with Strap  - 2 x daily - 7 x weekly - 2 sets - 10 reps - 3 hold

## 2025-03-12 ENCOUNTER — APPOINTMENT (OUTPATIENT)
Dept: PHYSICAL THERAPY | Facility: CLINIC | Age: 70
End: 2025-03-12
Payer: COMMERCIAL

## 2025-03-17 ENCOUNTER — PROCEDURE VISIT (OUTPATIENT)
Age: 70
End: 2025-03-17
Payer: COMMERCIAL

## 2025-03-17 VITALS — HEIGHT: 62 IN | WEIGHT: 137 LBS | BODY MASS INDEX: 25.21 KG/M2

## 2025-03-17 DIAGNOSIS — M99.02 SEGMENTAL DYSFUNCTION OF THORACIC REGION: ICD-10-CM

## 2025-03-17 DIAGNOSIS — M99.03 SEGMENTAL DYSFUNCTION OF LUMBAR REGION: ICD-10-CM

## 2025-03-17 DIAGNOSIS — M99.01 SEGMENTAL DYSFUNCTION OF CERVICAL REGION: ICD-10-CM

## 2025-03-17 DIAGNOSIS — M62.838 NECK MUSCLE SPASM: Primary | ICD-10-CM

## 2025-03-17 DIAGNOSIS — M47.812 CERVICAL SPONDYLOSIS: ICD-10-CM

## 2025-03-17 PROCEDURE — 98941 CHIROPRACT MANJ 3-4 REGIONS: CPT | Performed by: CHIROPRACTOR

## 2025-03-17 NOTE — PROGRESS NOTES
Diagnoses and all orders for this visit:    Neck muscle spasm    Segmental dysfunction of cervical region    Segmental dysfunction of thoracic region    Segmental dysfunction of lumbar region    Cervical spondylosis     DATE OF FIRST VISIT: 11/22/24    ASSESSMENT:   The symptoms and exam findings consistent with spondylosis of cervical spine secondary to repetitive st/sp injury, exacerbated by postural/ergonomic stressors. Pt responded well to stretches and manual mobilization of the affected spinal and myofascial tissues with increased ROM; trial of conservative tx recommended consisting of stretching, ther-ex, graded mobilization/manipulation of the affected spinal/myofascial tissues, postural/ergonomic education and take home stretches/exercises. If symptoms fail to improve with short trial of conservative care, appropriate imaging and referral will be coordinated.  - Tolerated treatment well with decrease in tightness and pain.    PROCEDURE CODES: 30384-ST    TREATMENT:  Fear avoidance behavior discussion, encouraged and reassured pt that natural course of condition is to improve over time with adherence to tx plan and home care strategies. Home care recommendations: avoid bed rest, walk (but avoid trails and uneven surfaces), gradual return to activity to tolerance (avoid anything that peripheralizes symptoms), ice 20 min on/off, watch for ice burn, call if symptoms peripheralize, worsen, or neurologic deficit progresses. Ther-ex: IASTM - discussed post procedure soreness and/or ecchymosis for up to 36 hrs, applied to affected mm hypertonicities; wall kandi, axial retraction, upper trap stretch, lev scap stretch, SCM stretch, lat rows with t-band, lat pull down with t-band, abdominal bracing; greater than 15 min spent performing above mentioned ther-ex. Thoracic mobilization/manipulation: prone P-A mob; cervical mobilization/manipulation: diversified supine graded mobilization, cervical traction, prone C/T jct  HVLA    HPI:  Neema Leger is a 69 y.o. female   Chief Complaint   Patient presents with   • Neck Pain     Neck pain-2   • Shoulder Pain     Shoulder pain-2   • Back Pain     Middle back pain-2     The patient presents to the office with increase pain in the mid back and into the neck that started on Labor day without traumatic event. The patient knows it has to do with stress which was high in June 2024. The patient lost several dear friends since then. Stress is lessening now. The patient has dull pain into the arms that also shoots R thumb. Weakness is getting worse. She started PT recently and its going well. The patient went to Kathi Vance DO her PCP and the patient was referred here for consult. Pain can at times wake her up but recently its been better. Valerian root helps her sleep. Pain is moderate today but at times when they spasm can be a 10/10. No HA.  12/16- The patient reports more stiff and achiness today more than pain 6/10. But the weekend before she had a lot of spasms do to the physical activities  12/23- Thd patient is achy 5/10 in the neck and mid back and lower back today.  1/10/25- The patient reports overall she is improvements. Still has spasms when she overdoes it.   1/17/25- The patient has increased pain into the arms today. 5/10 in the neck and shoulder, and 3/10 in the mid back.   1/24-The pt 7/10 in the neck and mid back and into the shoulders all are worse today.  2/3- The patient has achiness in the neck and L shoulder. 3/10 pain total  - Shoulders and mi back are all 3/10, she has the most pain still at night but mentions the she has improvements with rubbing it like I do in her appts.  2/24/25- The patient reports she overall had a good week,   3/3/25- The patient is feeling stiff and sore 5/10  3/17- The pt had improvements with steroids prescribed by doctor for a few days but pain returned with lower dose. MRI is coming up. 2/10.    Neck Pain     Shoulder Pain     Back  Pain      Past Medical History:   Diagnosis Date   • Seasonal allergies    • Vertigo       The following portions of the patient's history were reviewed and updated as appropriate: allergies, past family history, past medical history, past social history, past surgical history, and problem list.  Review of Systems   Musculoskeletal:  Positive for arthralgias, back pain, myalgias, neck pain and neck stiffness.     Physical Exam  Constitutional:       Appearance: Normal appearance.   Musculoskeletal:         General: Tenderness present. Normal range of motion.        Arms:       Cervical back: Normal range of motion. Rigidity and tenderness present.   Neurological:      Mental Status: She is alert and oriented to person, place, and time.      Gait: Gait is intact.      Deep Tendon Reflexes: Reflexes are normal and symmetric.   Psychiatric:         Attention and Perception: Attention normal.         Mood and Affect: Affect normal.         Speech: Speech normal.         Behavior: Behavior is cooperative.         Cognition and Memory: Cognition normal.       SOFT TISSUE ASSESSMENT: Hypertonicity and tenderness palpated C5-L1 erector spinae, upper/middle traps, lev scap, SCM, scalene, rhomboid, suboccipitals JOINT RECTRICTIONS: C5-T6, T10-L1 ORTHO: Tatyana unremarkable for centralization/peripheralization; max foraminal comp elicits local np R/L; shoulder depression elicits stiffness in R/L upper trap; brachial plexus tension test elicits neural tension in R/L UE; cervical distraction elicits relieves CC    Return in about 3 weeks (around 4/7/2025), or if symptoms worsen or fail to improve.

## 2025-03-19 ENCOUNTER — OFFICE VISIT (OUTPATIENT)
Dept: PHYSICAL THERAPY | Facility: CLINIC | Age: 70
End: 2025-03-19
Payer: COMMERCIAL

## 2025-03-19 DIAGNOSIS — M62.838 NECK MUSCLE SPASM: Primary | ICD-10-CM

## 2025-03-19 PROCEDURE — 97112 NEUROMUSCULAR REEDUCATION: CPT

## 2025-03-19 PROCEDURE — 97110 THERAPEUTIC EXERCISES: CPT

## 2025-03-19 PROCEDURE — 97140 MANUAL THERAPY 1/> REGIONS: CPT

## 2025-03-19 NOTE — PROGRESS NOTES
Daily Note      Today's date: 3/19/2025  Patient name: Neema Leger  : 1955  MRN: [id number]  Referring provider: Kathi Vance DO  Dx:   Encounter Diagnosis     ICD-10-CM    1. Neck muscle spasm  M62.838           Subjective: Pt reports that over the past week, she had been feeling better overall. Pt states that she was recently prescribed prednisone, which she took for 8 days. Pt states that for the first 4 days, she felt improvement in her neck symptoms and muscle spasms, but then began having side effects. Pt states that she had nausea and dizziness. Pt states that because she did not attend PT or chiropractic last week, she felt more spasms on Monday and Tuesday night of this week.        Objective: See treatment diary below    Assessment: Pt tolerated treatment well.  Reviewed D/C planning with pt and pt agrees that she is still on track for D/C next week due to nearing independence with symptom management. Pt demonstrates good form with exercises and decreased overall tissue tension in B/L UT/LS and cervical paraspinal musculature.     Plan: Continue per plan of care.           POC Expires Reeval for Medicare to be completed Unit Limit Auth Status Auth Expiration Date PT/OT/STVisit Limit FOTO   25 By visit N/A 6 units Approved 25 N/A     Completed on visit N/A                    Precautions: Standard    Auth Status 2/19 2/26 3/5 (RE-EVAL) 3/19     Approved  Visit # 4 5 6 7                       Manuals         STM/MFR B/L UT L > R, cervical paraspinals B/L UT, cervical paraspinals B/L UT, cervical paraspinals B/L UT, cervical paraspinals     Manual flexibility         Joint mobs cervical Cervical distraction w/ SOR Cervical distraction w/ SOR Cervical distraction w/ SOR Cervical distraction w/ SOR              Ther Ex         Cervical AROM         Seated thoracic extension 2x10 (5s) over 1/2 FR  2x10 (5s) over 1/2 FR 2x10 (5s) over 1/2 FR 2x10 (5s) over 1/2 FR     Cervical SNAG  extension 2x10 :05  2x10 (5s)  2x10 (5s)       Open book 10x5s  10x5s B/L  10x5s B/L  10x5s B/L      Nerve glides                                                      Neuro Re-Ed         Cervical retractions 10x         Chin tucks 2x10 (3s) supine 2x10 (3s) supine 2x10 (3s) supine 2x10 (3s)      Scap retractions 2x10 (5s) 2x10 (5s)  2x10 (5s)  2x10 (5s)      Rows 2x10 OTB  2x10 OTB 2x10 OTB 2x10 OTB     Shoulder extensions 2x10 OTB 2x10 OTB 2x10 OTB 2x10 OTB     B/L ER          Towel slides w/ ER iso                                    Ther Activity         Pt education                                      Access Code: [email or web address]  URL: [email or web address]  Date: 11/14/2024  Prepared by: Madan Algere    Exercises  - Seated Cervical Retraction  - 2 x daily - 7 x weekly - 2 sets - 10 reps - 3 hold  - Seated Thoracic Extension with Hands Supporting Neck  - 2 x daily - 7 x weekly - 2 sets - 10 reps - 5 hold  - Cervical Extension AROM with Strap  - 2 x daily - 7 x weekly - 2 sets - 10 reps - 3 hold

## 2025-03-23 ENCOUNTER — HOSPITAL ENCOUNTER (OUTPATIENT)
Dept: MRI IMAGING | Facility: HOSPITAL | Age: 70
Discharge: HOME | End: 2025-03-23
Attending: ANESTHESIOLOGY
Payer: COMMERCIAL

## 2025-03-23 DIAGNOSIS — M54.12 CERVICAL RADICULOPATHY: ICD-10-CM

## 2025-03-23 PROCEDURE — 72141 MRI NECK SPINE W/O DYE: CPT

## 2025-03-24 ENCOUNTER — PROCEDURE VISIT (OUTPATIENT)
Age: 70
End: 2025-03-24
Payer: COMMERCIAL

## 2025-03-24 VITALS — HEIGHT: 62 IN | BODY MASS INDEX: 25.21 KG/M2 | WEIGHT: 137 LBS

## 2025-03-24 DIAGNOSIS — M99.01 SEGMENTAL DYSFUNCTION OF CERVICAL REGION: ICD-10-CM

## 2025-03-24 DIAGNOSIS — M47.812 CERVICAL SPONDYLOSIS: ICD-10-CM

## 2025-03-24 DIAGNOSIS — M99.02 SEGMENTAL DYSFUNCTION OF THORACIC REGION: ICD-10-CM

## 2025-03-24 DIAGNOSIS — M62.838 NECK MUSCLE SPASM: Primary | ICD-10-CM

## 2025-03-24 DIAGNOSIS — M99.03 SEGMENTAL DYSFUNCTION OF LUMBAR REGION: ICD-10-CM

## 2025-03-24 PROCEDURE — 98941 CHIROPRACT MANJ 3-4 REGIONS: CPT | Performed by: CHIROPRACTOR

## 2025-03-24 NOTE — PROGRESS NOTES
Diagnoses and all orders for this visit:    Neck muscle spasm    Segmental dysfunction of cervical region    Segmental dysfunction of thoracic region    Segmental dysfunction of lumbar region    Cervical spondylosis     DATE OF FIRST VISIT: 11/22/24    ASSESSMENT:   The symptoms and exam findings consistent with spondylosis of cervical spine secondary to repetitive st/sp injury, exacerbated by postural/ergonomic stressors. Pt responded well to stretches and manual mobilization of the affected spinal and myofascial tissues with increased ROM; trial of conservative tx recommended consisting of stretching, ther-ex, graded mobilization/manipulation of the affected spinal/myofascial tissues, postural/ergonomic education and take home stretches/exercises. If symptoms fail to improve with short trial of conservative care, appropriate imaging and referral will be coordinated.  - Tolerated treatment well with decrease in tightness and pain.    PROCEDURE CODES: 89225-BW    TREATMENT:  Fear avoidance behavior discussion, encouraged and reassured pt that natural course of condition is to improve over time with adherence to tx plan and home care strategies. Home care recommendations: avoid bed rest, walk (but avoid trails and uneven surfaces), gradual return to activity to tolerance (avoid anything that peripheralizes symptoms), ice 20 min on/off, watch for ice burn, call if symptoms peripheralize, worsen, or neurologic deficit progresses. Ther-ex: IASTM - discussed post procedure soreness and/or ecchymosis for up to 36 hrs, applied to affected mm hypertonicities; wall kandi, axial retraction, upper trap stretch, lev scap stretch, SCM stretch, lat rows with t-band, lat pull down with t-band, abdominal bracing; greater than 15 min spent performing above mentioned ther-ex. Thoracic mobilization/manipulation: prone P-A mob; cervical mobilization/manipulation: diversified supine graded mobilization, cervical traction, prone C/T jct  HVLA    HPI:  Neema Leger is a 69 y.o. female   Chief Complaint   Patient presents with   • Neck Pain     Neck pain-2   • Shoulder Pain     Shoulder pain-2   • Back Pain     Middle back pain-2     The patient presents to the office with increase pain in the mid back and into the neck that started on Labor day without traumatic event. The patient knows it has to do with stress which was high in June 2024. The patient lost several dear friends since then. Stress is lessening now. The patient has dull pain into the arms that also shoots R thumb. Weakness is getting worse. She started PT recently and its going well. The patient went to Kathi Vance DO her PCP and the patient was referred here for consult. Pain can at times wake her up but recently its been better. Valerian root helps her sleep. Pain is moderate today but at times when they spasm can be a 10/10. No HA.  12/16- The patient reports more stiff and achiness today more than pain 6/10. But the weekend before she had a lot of spasms do to the physical activities  12/23- Thd patient is achy 5/10 in the neck and mid back and lower back today.  1/10/25- The patient reports overall she is improvements. Still has spasms when she overdoes it.   1/17/25- The patient has increased pain into the arms today. 5/10 in the neck and shoulder, and 3/10 in the mid back.   1/24-The pt 7/10 in the neck and mid back and into the shoulders all are worse today.  2/3- The patient has achiness in the neck and L shoulder. 3/10 pain total  - Shoulders and mi back are all 3/10, she has the most pain still at night but mentions the she has improvements with rubbing it like I do in her appts.  2/24/25- The patient reports she overall had a good week,   3/3/25- The patient is feeling stiff and sore 5/10  3/17- The pt had improvements with steroids prescribed by doctor for a few days but pain returned with lower dose. MRI is coming up. 2/10.  3/24/25- The patient has more spasms in the neck.      Neck Pain     Shoulder Pain     Back Pain    Past Medical History:   Diagnosis Date   • Seasonal allergies    • Vertigo       The following portions of the patient's history were reviewed and updated as appropriate: allergies, past family history, past medical history, past social history, past surgical history, and problem list.  Review of Systems   Musculoskeletal:  Positive for arthralgias, back pain, myalgias, neck pain and neck stiffness.     Physical Exam  Constitutional:       Appearance: Normal appearance.   Musculoskeletal:         General: Tenderness present. Normal range of motion.        Arms:       Cervical back: Normal range of motion. Rigidity and tenderness present.   Neurological:      Mental Status: She is alert and oriented to person, place, and time.      Gait: Gait is intact.      Deep Tendon Reflexes: Reflexes are normal and symmetric.   Psychiatric:         Attention and Perception: Attention normal.         Mood and Affect: Affect normal.         Speech: Speech normal.         Behavior: Behavior is cooperative.         Cognition and Memory: Cognition normal.     SOFT TISSUE ASSESSMENT: Hypertonicity and tenderness palpated C5-L1 erector spinae, upper/middle traps, lev scap, SCM, scalene, rhomboid, suboccipitals JOINT RECTRICTIONS: C5-T6, T10-L1 ORTHO: Tatyana unremarkable for centralization/peripheralization; max foraminal comp elicits local np R/L; shoulder depression elicits stiffness in R/L upper trap; brachial plexus tension test elicits neural tension in R/L UE; cervical distraction elicits relieves CC    Return in about 1 week (around 3/31/2025) for Recheck.

## 2025-03-26 ENCOUNTER — OFFICE VISIT (OUTPATIENT)
Dept: PHYSICAL THERAPY | Facility: CLINIC | Age: 70
End: 2025-03-26
Payer: COMMERCIAL

## 2025-03-26 DIAGNOSIS — M62.838 NECK MUSCLE SPASM: Primary | ICD-10-CM

## 2025-03-26 PROCEDURE — 97140 MANUAL THERAPY 1/> REGIONS: CPT

## 2025-03-26 PROCEDURE — 97110 THERAPEUTIC EXERCISES: CPT

## 2025-03-26 PROCEDURE — 97112 NEUROMUSCULAR REEDUCATION: CPT

## 2025-03-26 NOTE — PROGRESS NOTES
PT Discharge      Today's date: 3/26/2025  Patient name: Neema Leger  : 1955  MRN: [id number]  Referring provider: Kathi Vance DO  Dx:   Encounter Diagnosis     ICD-10-CM    1. Neck muscle spasm  M62.838           Subjective: Pt reports that she has noticed significant progress since starting PT. Pt states that today, her shoulders and arms happen to feel achy and sometimes wakes up due to pain. Pt states that she does know what to do in order to manage her symptoms. Pt states that she sees chiropractic on Monday and is considering tapering her frequency there. Pt states that she has undergone an MRI and meets with her spine/pain specialist next week.        Objective: See treatment diary below    Assessment: Pt tolerated treatment well.  Pt demonstrates excellent understanding and form with exercises and demonstrates good motivation to continue with HEP independently. Pt demonstrates good understanding of self-management techniques and is motivated to manage this condition on her own.     Goals  Short Term Goals [3 weeks; target date: 12/15/24]  1. Patient will be independent with initial HEP. - goal met  2. Patient will demonstrate an increase in cervical AROM by 5 degrees in all planes. - goal met  3. Patient will present with moderate restriction in thoracic extension ROM. - goal met    Long Term Goals [6 weeks; target date: 25]  1. Patient will demonstrate an increase in cervical AROM to WNL in order to promote self-care activities pain-free. - goal met  2. Patient will present with mild restriction in thoracic ROM. - not met  3. Patient will be able to self-correct posture in the clinic 75% of the time or greater. - not met  4. Patient will be independent with comprehensive HEP. - goal met    Plan:  Discharge from PT.           POC Expires Reeval for Medicare to be completed Unit Limit Auth Status Auth Expiration Date PT/OT/STVisit Limit FOTO   25 By visit N/A 6 units Approved  12/31/25 N/A     Completed on visit N/A                    Precautions: Standard    Auth Status 2/19 2/26 3/5 (RE-EVAL) 3/19 3/26    Approved  Visit # 4 5 6 7 8                      Manuals         STM/MFR B/L UT L > R, cervical paraspinals B/L UT, cervical paraspinals B/L UT, cervical paraspinals B/L UT, cervical paraspinals B/L UT, cervical paraspinals    Manual flexibility         Joint mobs cervical Cervical distraction w/ SOR Cervical distraction w/ SOR Cervical distraction w/ SOR Cervical distraction w/ SOR Cervical distraction w/ SOR             Ther Ex         Cervical AROM         Seated thoracic extension 2x10 (5s) over 1/2 FR  2x10 (5s) over 1/2 FR 2x10 (5s) over 1/2 FR 2x10 (5s) over 1/2 FR 2x10 (5s) over 1/2 FR    Cervical SNAG extension 2x10 :05  2x10 (5s)  2x10 (5s)       Open book 10x5s  10x5s B/L  10x5s B/L  10x5s B/L  10x5s B/L     Nerve glides                                                      Neuro Re-Ed         Cervical retractions 10x         Chin tucks 2x10 (3s) supine 2x10 (3s) supine 2x10 (3s) supine 2x10 (3s)  2x10 (3s)     Scap retractions 2x10 (5s) 2x10 (5s)  2x10 (5s)  2x10 (5s)  2x10 (5s)     Rows 2x10 OTB  2x10 OTB 2x10 OTB 2x10 OTB 2x10 OTB    Shoulder extensions 2x10 OTB 2x10 OTB 2x10 OTB 2x10 OTB 2x10 OTB    B/L ER          Towel slides w/ ER iso                                    Ther Activity         Pt education     D/C plan, HEP                                 Access Code: [email or web address]  URL: [email or web address]  Date: 03/26/2025  Prepared by: Madan Alegre    Exercises  - Seated Cervical Retraction  - 1 x daily - 7 x weekly - 2 sets - 10 reps - 3 hold  - Seated Scapular Retraction  - 1 x daily - 7 x weekly - 2 sets - 10 reps - 5 hold  - Seated Thoracic Extension with Hands Supporting Neck  - 1 x daily - 7 x weekly - 2 sets - 10 reps - 5 hold  - Sidelying Thoracic Rotation with Open Book  - 1 x daily - 7 x weekly - 1 sets - 10 reps - 5 hold  - Standing Row with  Anchored Resistance  - 1 x daily - 7 x weekly - 2 sets - 10 reps - 5 hold  - Shoulder extension with resistance - Neutral  - 1 x daily - 7 x weekly - 2 sets - 10 reps - 5 hold

## 2025-03-31 ENCOUNTER — PROCEDURE VISIT (OUTPATIENT)
Age: 70
End: 2025-03-31
Payer: COMMERCIAL

## 2025-03-31 VITALS — WEIGHT: 137 LBS | HEIGHT: 62 IN | BODY MASS INDEX: 25.21 KG/M2

## 2025-03-31 DIAGNOSIS — M99.03 SEGMENTAL DYSFUNCTION OF LUMBAR REGION: ICD-10-CM

## 2025-03-31 DIAGNOSIS — M99.02 SEGMENTAL DYSFUNCTION OF THORACIC REGION: ICD-10-CM

## 2025-03-31 DIAGNOSIS — M99.01 SEGMENTAL DYSFUNCTION OF CERVICAL REGION: ICD-10-CM

## 2025-03-31 DIAGNOSIS — M47.812 CERVICAL SPONDYLOSIS: ICD-10-CM

## 2025-03-31 DIAGNOSIS — M62.838 NECK MUSCLE SPASM: Primary | ICD-10-CM

## 2025-03-31 PROCEDURE — 98941 CHIROPRACT MANJ 3-4 REGIONS: CPT | Performed by: CHIROPRACTOR

## 2025-03-31 NOTE — PROGRESS NOTES
Diagnoses and all orders for this visit:    Neck muscle spasm    Segmental dysfunction of cervical region    Segmental dysfunction of thoracic region    Segmental dysfunction of lumbar region    Cervical spondylosis     DATE OF FIRST VISIT: 11/22/24    ASSESSMENT:   The symptoms and exam findings consistent with spondylosis of cervical spine secondary to repetitive st/sp injury, exacerbated by postural/ergonomic stressors. Pt responded well to stretches and manual mobilization of the affected spinal and myofascial tissues with increased ROM; trial of conservative tx recommended consisting of stretching, ther-ex, graded mobilization/manipulation of the affected spinal/myofascial tissues, postural/ergonomic education and take home stretches/exercises. If symptoms fail to improve with short trial of conservative care, appropriate imaging and referral will be coordinated.  - Tolerated treatment well with decrease in tightness and pain.    PROCEDURE CODES: [id number]    TREATMENT:  Fear avoidance behavior discussion, encouraged and reassured pt that natural course of condition is to improve over time with adherence to tx plan and home care strategies. Home care recommendations: avoid bed rest, walk (but avoid trails and uneven surfaces), gradual return to activity to tolerance (avoid anything that peripheralizes symptoms), ice 20 min on/off, watch for ice burn, call if symptoms peripheralize, worsen, or neurologic deficit progresses. Ther-ex: IASTM - discussed post procedure soreness and/or ecchymosis for up to 36 hrs, applied to affected mm hypertonicities; wall kandi, axial retraction, upper trap stretch, lev scap stretch, SCM stretch, lat rows with t-band, lat pull down with t-band, abdominal bracing; greater than 15 min spent performing above mentioned ther-ex. Thoracic mobilization/manipulation: prone P-A mob; cervical mobilization/manipulation: diversified supine graded mobilization, cervical traction, prone C/T jct  HVLA    HPI:  Neema Leger is a 69 y.o. female   Chief Complaint   Patient presents with   • Neck Pain     Pain score 3     The patient presents to the office with increase pain in the mid back and into the neck that started on Labor day without traumatic event. The patient knows it has to do with stress which was high in June 2024. The patient lost several dear friends since then. Stress is lessening now. The patient has dull pain into the arms that also shoots R thumb. Weakness is getting worse. She started PT recently and its going well. The patient went to Kathi Vance DO her PCP and the patient was referred here for consult. Pain can at times wake her up but recently its been better. Valerian root helps her sleep. Pain is moderate today but at times when they spasm can be a 10/10. No HA.  12/16- The patient reports more stiff and achiness today more than pain 6/10. But the weekend before she had a lot of spasms do to the physical activities  12/23- Thd patient is achy 5/10 in the neck and mid back and lower back today.  1/10/25- The patient reports overall she is improvements. Still has spasms when she overdoes it.   1/17/25- The patient has increased pain into the arms today. 5/10 in the neck and shoulder, and 3/10 in the mid back.   1/24-The pt 7/10 in the neck and mid back and into the shoulders all are worse today.  2/3- The patient has achiness in the neck and L shoulder. 3/10 pain total  - Shoulders and mi back are all 3/10, she has the most pain still at night but mentions the she has improvements with rubbing it like I do in her appts.  2/24/25- The patient reports she overall had a good week,   3/3/25- The patient is feeling stiff and sore 5/10  3/17- The pt had improvements with steroids prescribed by doctor for a few days but pain returned with lower dose. MRI is coming up. 2/10.  3/24/25- The patient has more spasms in the neck.   3/31/25- The patient is feeling crappy today,  3/10.     Neck Pain      Shoulder Pain     Back Pain    Past Medical History:   Diagnosis Date   • Seasonal allergies    • Vertigo       The following portions of the patient's history were reviewed and updated as appropriate: allergies, past family history, past medical history, past social history, past surgical history, and problem list.  Review of Systems   Musculoskeletal:  Positive for arthralgias, back pain, myalgias, neck pain and neck stiffness.     Physical Exam  Constitutional:       Appearance: Normal appearance.   Musculoskeletal:         General: Tenderness present. Normal range of motion.        Arms:       Cervical back: Normal range of motion. Rigidity and tenderness present.   Neurological:      Mental Status: She is alert and oriented to person, place, and time.      Gait: Gait is intact.      Deep Tendon Reflexes: Reflexes are normal and symmetric.   Psychiatric:         Attention and Perception: Attention normal.         Mood and Affect: Affect normal.         Speech: Speech normal.         Behavior: Behavior is cooperative.         Cognition and Memory: Cognition normal.     SOFT TISSUE ASSESSMENT: Hypertonicity and tenderness palpated C5-L1 erector spinae, upper/middle traps, lev scap, SCM, scalene, rhomboid, suboccipitals JOINT RECTRICTIONS: C5-T6, T10-L1 ORTHO: Tatyana unremarkable for centralization/peripheralization; max foraminal comp elicits local np R/L; shoulder depression elicits stiffness in R/L upper trap; brachial plexus tension test elicits neural tension in R/L UE; cervical distraction elicits relieves CC    Return in about 1 week (around 4/7/2025) for Recheck.

## 2025-04-03 ENCOUNTER — OFFICE VISIT (OUTPATIENT)
Dept: PAIN MEDICINE | Facility: CLINIC | Age: 70
End: 2025-04-03
Payer: COMMERCIAL

## 2025-04-03 VITALS — BODY MASS INDEX: 25.03 KG/M2 | HEIGHT: 62 IN | TEMPERATURE: 97.6 F | HEART RATE: 71 BPM | WEIGHT: 136 LBS

## 2025-04-03 DIAGNOSIS — M50.120 CERVICAL DISC DISORDER WITH RADICULOPATHY OF MID-CERVICAL REGION: ICD-10-CM

## 2025-04-03 DIAGNOSIS — M54.12 CERVICAL RADICULOPATHY: ICD-10-CM

## 2025-04-03 DIAGNOSIS — M48.02 CERVICAL SPINAL STENOSIS: Primary | ICD-10-CM

## 2025-04-03 PROCEDURE — 99214 OFFICE O/P EST MOD 30 MIN: CPT | Performed by: NURSE PRACTITIONER

## 2025-04-03 NOTE — PROGRESS NOTES
Assessment:  1. Cervical spinal stenosis    2. Cervical disc disorder with radiculopathy of mid-cervical region    3. Cervical radiculopathy        Plan:  The patient is a 69 y.o. female last seen on 3-4-25 who presents for a follow up office visit in regards to chronic pain secondary to neck pain, cervical spondylosis, cervical stenosis and cervical radiculopathy.  The patient presents today with ongoing neck pain.  The neck pain has significantly improved with chiropractic and physical therapy.  She also has been taking vitamin supplements and cut gluten out of her diet.  She states the left arm pain has resolved.  She does have stiffness in her neck and shoulders but this improves with her daily stretching routine.  We reviewed the MRI of the cervical spine using the imaging.  She has moderate central and severe bilateral foraminal stenosis C5-C6 and moderate bilateral foraminal stenosis C6-C7.  Since the pain has improved we will hold off on interventions.  If the pain would worsen, she was instructed to contact the office and a cervical epidural steroid injection can be performed      The patient will follow-up as needed for reevaluation. The patient was advised to contact the office should their symptoms worsen in the interim. The patient was agreeable and verbalized an understanding.        History of Present Illness:    The patient is a 69 y.o. female last seen on 3-4-25 who presents for a follow up office visit in regards to chronic pain secondary to neck pain, cervical spondylosis, cervical stenosis and cervical radiculopathy.  The patient's last office visit was March 4, 2025 which was her initial consultation.  She was ordered an MRI of the cervical spine.  Patient presents today with minimal complaints of neck pain.  She feels the pain radiates into both shoulders, but the left arm pain has resolved.  The pain is constant but has improved since the last office visit.   She contributes this to attending  physical therapy and chiropractic therapy which are both very helpful.  She also has been seeing functional medication for supplementation and stopped eating gluten.  She states she does not have pain just stiffness.  This improves with her daily stretches.  She also states the pain is not waking her up at night. She does not feel the pain is interfering with her activities of daily living.  She is rating her pain a 2/10 on the numeric rating scale    She is taking no prescription pain medication. She states the prednisone taper caused side effects with little pain relief.     I have personally reviewed and/or updated the patient's past medical history, past surgical history, family history, social history, current medications, allergies, and vital signs today.       Review of Systems:    Review of Systems   Respiratory:  Negative for shortness of breath.    Cardiovascular:  Negative for chest pain.   Gastrointestinal:  Negative for constipation, diarrhea, nausea and vomiting.   Musculoskeletal:  Negative for arthralgias, gait problem, joint swelling (Joint Stiffness) and myalgias.   Skin:  Negative for rash.   Neurological:  Negative for dizziness, seizures and weakness.   All other systems reviewed and are negative.        Past Medical History:   Diagnosis Date    Seasonal allergies     Vertigo        Past Surgical History:   Procedure Laterality Date    DILATION AND CURETTAGE OF UTERUS      DILATION AND CURETTAGE OF UTERUS      SAB    TONSILECTOMY AND ADNOIDECTOMY      TUBAL LIGATION      VEIN SURGERY Right     Leg       Family History   Problem Relation Age of Onset    Breast cancer Mother         70's    Heart block Father     Breast cancer Sister 60    Diabetes Sister     Cancer Sister     No Known Problems Sister     Lung cancer Sister 72    No Known Problems Maternal Grandmother     No Known Problems Maternal Grandfather     No Known Problems Paternal Grandmother     No Known Problems Paternal Grandfather   "   No Known Problems Maternal Aunt     No Known Problems Maternal Aunt     No Known Problems Maternal Aunt     No Known Problems Paternal Aunt        Social History     Occupational History    Not on file   Tobacco Use    Smoking status: Some Days     Current packs/day: 0.30     Types: Cigarettes    Smokeless tobacco: Never   Vaping Use    Vaping status: Never Used   Substance and Sexual Activity    Alcohol use: Not Currently    Drug use: Never    Sexual activity: Not Currently         Current Outpatient Medications:     Ascorbic Acid (vitamin C) 1000 MG tablet, Take 1,000 mg by mouth daily, Disp: , Rfl:     Magnesium-Potassium 250-100 MG TABS, Take by mouth, Disp: , Rfl:     Multiple Vitamins-Minerals (Energy Booster) PACK, Take by mouth Energy 2000 liquid, Disp: , Rfl:     NON FORMULARY, Cell food, Disp: , Rfl:     NON FORMULARY, Thyroid support, Disp: , Rfl:     NON FORMULARY, macuguard, Disp: , Rfl:     Allergies   Allergen Reactions    Codeine Other (See Comments)     Reaction Date: 20Feb2012;       Physical Exam:    Pulse 71   Temp 97.6 °F (36.4 °C)   Ht 5' 2\" (1.575 m)   Wt 61.7 kg (136 lb)   BMI 24.87 kg/m²     Constitutional:normal, well developed, well nourished, alert, in no distress and non-toxic and no overt pain behavior.  Eyes:anicteric  HEENT:grossly intact  Neck:supple, symmetric, trachea midline and no masses   Pulmonary:even and unlabored  Cardiovascular:No edema or pitting edema present  Skin:Normal without rashes or lesions and well hydrated  Psychiatric:Mood and affect appropriate  Neurologic:Cranial Nerves II-XII grossly intact  Musculoskeletal:normal      Imaging  MRI CERVICAL SPINE WITHOUT CONTRAST @  3-23-25     INDICATION: M54.12: Radiculopathy, cervical region.     COMPARISON:  None.     TECHNIQUE:  Multiplanar, multisequence imaging of the cervical spine was performed. .        IMAGE QUALITY:  Diagnostic     FINDINGS:     ALIGNMENT: Anterior subluxation of C3 in relation to C4 and " C7 in relation to both C6 and T1, likely on the basis of degenerative ligamentous laxity. No scoliosis. No compression fracture.     MARROW SIGNAL: Endplate marrow degenerative change C4-5, C5-6 and C6-7. There is a large Schmorl's node within the superior endplate of the T2 vertebral body with no acute marrow edema.     CERVICAL AND VISUALIZED THORACIC CORD:  Normal signal within the visualized cord.     PREVERTEBRAL AND PARASPINAL SOFT TISSUES:  Normal.     VISUALIZED POSTERIOR FOSSA:  The visualized posterior fossa demonstrates no abnormal signal.     CERVICAL DISC SPACES:     C2-C3: Normal disc height. Bilateral facet hypertrophic degenerative change. No disc herniation or canal stenosis. Mild bilateral foraminal narrowing.     C3-C4: Slight anterior subluxation of C3 upon C4. Normal disc height and signal with no disc herniation. Moderate bilateral facet arthropathy. No canal stenosis. Moderate left and mild right facet degenerative change.     C4-C5: Disc desiccation and loss of disc height. Mild annular bulging. Bilateral uncinate joint hypertrophic change, right greater than left and bilateral facet arthropathy, left greater than right. There is mild canal stenosis without cord compression.   Advanced left and moderate right foraminal narrowing.     C5-C6: Disc desiccation and loss of disc height. Annular bulging with endplate hypertrophic change. Although significant bilateral, these hypertrophic changes are more prominent on the right. Moderate canal stenosis with effacement of the ventral and   dorsal CSF space. Severe bilateral foraminal narrowing, right greater than left.     C6-C7: Disc desiccation and loss of disc height with advanced endplate and uncinate joint hypertrophic changes. No discrete disc herniation. Mild canal stenosis and moderate bilateral foraminal narrowing.     C7-T1: Mild annular bulging and facet arthropathy. No canal stenosis or foraminal narrowing.     UPPER THORACIC DISC SPACES:  Upper thoracic degenerative change at the T1-2 and T2-3 levels with annular bulging. Mild facet and uncinate joint hypertrophic change. No canal stenosis or cord compression. There is mild right foraminal narrowing at the T2-3   level.     OTHER FINDINGS:  None.     IMPRESSION:     Moderate to advanced cervical spondylitic degenerative change. Canal stenosis appears most pronounced at the C5-6 level where there is effacement of the ventral and dorsal CSF space but no cord compression or abnormal cord signal. Multilevel moderate to   advanced foraminal narrowing.    I have personally reviewed pertinent films in PACS.

## 2025-04-23 ENCOUNTER — PROCEDURE VISIT (OUTPATIENT)
Age: 70
End: 2025-04-23
Payer: COMMERCIAL

## 2025-04-23 VITALS — HEIGHT: 62 IN | WEIGHT: 136 LBS | BODY MASS INDEX: 25.03 KG/M2

## 2025-04-23 DIAGNOSIS — M47.812 CERVICAL SPONDYLOSIS: ICD-10-CM

## 2025-04-23 DIAGNOSIS — M99.02 SEGMENTAL DYSFUNCTION OF THORACIC REGION: ICD-10-CM

## 2025-04-23 DIAGNOSIS — M62.838 NECK MUSCLE SPASM: Primary | ICD-10-CM

## 2025-04-23 DIAGNOSIS — M99.03 SEGMENTAL DYSFUNCTION OF LUMBAR REGION: ICD-10-CM

## 2025-04-23 DIAGNOSIS — M99.01 SEGMENTAL DYSFUNCTION OF CERVICAL REGION: ICD-10-CM

## 2025-04-23 PROCEDURE — 98941 CHIROPRACT MANJ 3-4 REGIONS: CPT | Performed by: CHIROPRACTOR

## 2025-04-23 NOTE — PROGRESS NOTES
Diagnoses and all orders for this visit:    Neck muscle spasm    Segmental dysfunction of cervical region    Segmental dysfunction of thoracic region    Segmental dysfunction of lumbar region    Cervical spondylosis     DATE OF FIRST VISIT: 11/22/24    ASSESSMENT:   The symptoms and exam findings consistent with spondylosis of cervical spine secondary to repetitive st/sp injury, exacerbated by postural/ergonomic stressors. Pt responded well to stretches and manual mobilization of the affected spinal and myofascial tissues with increased ROM; trial of conservative tx recommended consisting of stretching, ther-ex, graded mobilization/manipulation of the affected spinal/myofascial tissues, postural/ergonomic education and take home stretches/exercises. If symptoms fail to improve with short trial of conservative care, appropriate imaging and referral will be coordinated.  - Tolerated treatment well with decrease in tightness and pain.    PROCEDURE CODES: 04884-SL    TREATMENT:  Fear avoidance behavior discussion, encouraged and reassured pt that natural course of condition is to improve over time with adherence to tx plan and home care strategies. Home care recommendations: avoid bed rest, walk (but avoid trails and uneven surfaces), gradual return to activity to tolerance (avoid anything that peripheralizes symptoms), ice 20 min on/off, watch for ice burn, call if symptoms peripheralize, worsen, or neurologic deficit progresses. Ther-ex: IASTM - discussed post procedure soreness and/or ecchymosis for up to 36 hrs, applied to affected mm hypertonicities; wall kandi, axial retraction, upper trap stretch, lev scap stretch, SCM stretch, lat rows with t-band, lat pull down with t-band, abdominal bracing; greater than 15 min spent performing above mentioned ther-ex. Thoracic mobilization/manipulation: prone P-A mob; cervical mobilization/manipulation: diversified supine graded mobilization, cervical traction    HPI:  [place]ema  [place] is a 69 y.o. female   Chief Complaint   Patient presents with   • Neck Pain     Neck pain-2   • Shoulder Pain     Shoulder pain-2   • Back Pain     Middle back pain-2     The patient presents to the office with increase pain in the mid back and into the neck that started on Labor day without traumatic event. The patient knows it has to do with stress which was high in June 2024. The patient lost several dear friends since then. Stress is lessening now. The patient has dull pain into the arms that also shoots R thumb. Weakness is getting worse. She started PT recently and its going well. The patient went to Kathi Vance DO her PCP and the patient was referred here for consult. Pain can at times wake her up but recently its been better. Valerian root helps her sleep. Pain is moderate today but at times when they spasm can be a 10/10. No HA.  12/16- The patient reports more stiff and achiness today more than pain 6/10. But the weekend before she had a lot of spasms do to the physical activities  12/23- Thd patient is achy 5/10 in the neck and mid back and lower back today.  1/10/25- The patient reports overall she is improvements. Still has spasms when she overdoes it.   1/17/25- The patient has increased pain into the arms today. 5/10 in the neck and shoulder, and 3/10 in the mid back.   1/24-The pt 7/10 in the neck and mid back and into the shoulders all are worse today.  2/3- The patient has achiness in the neck and L shoulder. 3/10 pain total  - Shoulders and mi back are all 3/10, she has the most pain still at night but mentions the she has improvements with rubbing it like I do in her appts.  2/24/25- The patient reports she overall had a good week,   3/3/25- The patient is feeling stiff and sore 5/10  3/17- The pt had improvements with steroids prescribed by doctor for a few days but pain returned with lower dose. MRI is coming up. 2/10.  3/24/25- The patient has more spasms in the neck.   3/31/25- The  patient is feeling crappy today,  3/10.   4/23/25- The patient presents 2/10 with pain.     Neck Pain     Shoulder Pain     Back Pain      Past Medical History:   Diagnosis Date   • Seasonal allergies    • Vertigo       The following portions of the patient's history were reviewed and updated as appropriate: allergies, past family history, past medical history, past social history, past surgical history, and problem list.  Review of Systems   Musculoskeletal:  Positive for arthralgias, back pain, myalgias, neck pain and neck stiffness.     Physical Exam  Constitutional:       Appearance: Normal appearance.   Musculoskeletal:         General: Tenderness present. Normal range of motion.        Arms:       Cervical back: Normal range of motion. Rigidity and tenderness present.   Neurological:      Mental Status: She is alert and oriented to person, place, and time.      Gait: Gait is intact.      Deep Tendon Reflexes: Reflexes are normal and symmetric.   Psychiatric:         Attention and Perception: Attention normal.         Mood and Affect: Affect normal.         Speech: Speech normal.         Behavior: Behavior is cooperative.         Cognition and Memory: Cognition normal.       SOFT TISSUE ASSESSMENT: Hypertonicity and tenderness palpated C5-L1 erector spinae, upper/middle traps, lev scap, SCM, scalene, rhomboid, suboccipitals JOINT RECTRICTIONS: C5-T6, T10-L1 ORTHO: Tatyana unremarkable for centralization/peripheralization; max foraminal comp elicits local np R/L; shoulder depression elicits stiffness in R/L upper trap; brachial plexus tension test elicits neural tension in R/L UE; cervical distraction elicits relieves CC    Return in about 1 month (around 5/23/2025) for Recheck.

## 2025-05-23 ENCOUNTER — PROCEDURE VISIT (OUTPATIENT)
Age: 70
End: 2025-05-23
Payer: COMMERCIAL

## 2025-05-23 VITALS — WEIGHT: 136 LBS | HEIGHT: 62 IN | BODY MASS INDEX: 25.03 KG/M2

## 2025-05-23 DIAGNOSIS — M99.03 SEGMENTAL DYSFUNCTION OF LUMBAR REGION: ICD-10-CM

## 2025-05-23 DIAGNOSIS — M99.01 SEGMENTAL DYSFUNCTION OF CERVICAL REGION: Primary | ICD-10-CM

## 2025-05-23 DIAGNOSIS — M62.838 NECK MUSCLE SPASM: ICD-10-CM

## 2025-05-23 DIAGNOSIS — M47.812 CERVICAL SPONDYLOSIS: ICD-10-CM

## 2025-05-23 DIAGNOSIS — M99.02 SEGMENTAL DYSFUNCTION OF THORACIC REGION: ICD-10-CM

## 2025-05-23 PROCEDURE — 98941 CHIROPRACT MANJ 3-4 REGIONS: CPT | Performed by: CHIROPRACTOR

## 2025-05-23 NOTE — PROGRESS NOTES
Diagnoses and all orders for this visit:    Segmental dysfunction of cervical region    Cervical spondylosis    Segmental dysfunction of thoracic region    Neck muscle spasm    Segmental dysfunction of lumbar region     DATE OF FIRST VISIT: 11/22/24    ASSESSMENT:   The symptoms and exam findings consistent with spondylosis of cervical spine secondary to repetitive st/sp injury, exacerbated by postural/ergonomic stressors. Pt responded well to stretches and manual mobilization of the affected spinal and myofascial tissues with increased ROM; trial of conservative tx recommended consisting of stretching, ther-ex, graded mobilization/manipulation of the affected spinal/myofascial tissues, postural/ergonomic education and take home stretches/exercises. If symptoms fail to improve with short trial of conservative care, appropriate imaging and referral will be coordinated.  - Tolerated treatment well with decrease in tightness and pain.    PROCEDURE CODES: 78304-DL    TREATMENT:  Fear avoidance behavior discussion, encouraged and reassured pt that natural course of condition is to improve over time with adherence to tx plan and home care strategies. Home care recommendations: avoid bed rest, walk (but avoid trails and uneven surfaces), gradual return to activity to tolerance (avoid anything that peripheralizes symptoms), ice 20 min on/off, watch for ice burn, call if symptoms peripheralize, worsen, or neurologic deficit progresses. Ther-ex: IASTM - discussed post procedure soreness and/or ecchymosis for up to 36 hrs, applied to affected mm hypertonicities; wall kandi, axial retraction, upper trap stretch, lev scap stretch, SCM stretch, lat rows with t-band, lat pull down with t-band, abdominal bracing; greater than 15 min spent performing above mentioned ther-ex. Thoracic mobilization/manipulation: prone P-A mob; cervical mobilization/manipulation: diversified supine graded mobilization, cervical traction    HPI:  Neema  [place] is a 69 y.o. female   Chief Complaint   Patient presents with   • Neck Pain     Neck pain-2   • Shoulder Pain     Shoulder pain-2   • Back Pain     Middle back pain-2     The patient presents to the office with increase pain in the mid back and into the neck that started on Labor day without traumatic event. The patient knows it has to do with stress which was high in June 2024. The patient lost several dear friends since then. Stress is lessening now. The patient has dull pain into the arms that also shoots R thumb. Weakness is getting worse. She started PT recently and its going well. The patient went to Kathi Vance DO her PCP and the patient was referred here for consult. Pain can at times wake her up but recently its been better. Valerian root helps her sleep. Pain is moderate today but at times when they spasm can be a 10/10. No HA.  12/16- The patient reports more stiff and achiness today more than pain 6/10. But the weekend before she had a lot of spasms do to the physical activities  12/23- Thd patient is achy 5/10 in the neck and mid back and lower back today.  1/10/25- The patient reports overall she is improvements. Still has spasms when she overdoes it.   1/17/25- The patient has increased pain into the arms today. 5/10 in the neck and shoulder, and 3/10 in the mid back.   1/24-The pt 7/10 in the neck and mid back and into the shoulders all are worse today.  2/3- The patient has achiness in the neck and L shoulder. 3/10 pain total  - Shoulders and mi back are all 3/10, she has the most pain still at night but mentions the she has improvements with rubbing it like I do in her appts.  2/24/25- The patient reports she overall had a good week,   3/3/25- The patient is feeling stiff and sore 5/10  3/17- The pt had improvements with steroids prescribed by doctor for a few days but pain returned with lower dose. MRI is coming up. 2/10.  3/24/25- The patient has more spasms in the neck.   3/31/25- The  patient is feeling crappy today,  3/10.   4/23/25- The patient presents 2/10 with pain.   5/23/25- The pt is feeling a little better overall.    Neck Pain     Shoulder Pain     Back Pain    Past Medical History:   Diagnosis Date   • Seasonal allergies    • Vertigo       The following portions of the patient's history were reviewed and updated as appropriate: allergies, past family history, past medical history, past social history, past surgical history, and problem list.  Review of Systems   Musculoskeletal:  Positive for arthralgias, back pain, myalgias, neck pain and neck stiffness.     Physical Exam  Constitutional:       Appearance: Normal appearance.     Musculoskeletal:         General: Tenderness present. Normal range of motion.        Arms:       Cervical back: Normal range of motion. Rigidity and tenderness present.     Neurological:      Mental Status: She is alert and oriented to person, place, and time.      Gait: Gait is intact.      Deep Tendon Reflexes: Reflexes are normal and symmetric.     Psychiatric:         Attention and Perception: Attention normal.         Mood and Affect: Affect normal.         Speech: Speech normal.         Behavior: Behavior is cooperative.         Cognition and Memory: Cognition normal.     SOFT TISSUE ASSESSMENT: Hypertonicity and tenderness palpated C5-L1 erector spinae, upper/middle traps, lev scap, SCM, scalene, rhomboid, suboccipitals JOINT RECTRICTIONS: C5-T6, T10-L1 ORTHO: Tatyana unremarkable for centralization/peripheralization; max foraminal comp elicits local np R/L; shoulder depression elicits stiffness in R/L upper trap; brachial plexus tension test elicits neural tension in R/L UE; cervical distraction elicits relieves CC    Return in about 1 month (around 6/23/2025) for Recheck.

## 2025-06-13 ENCOUNTER — PROCEDURE VISIT (OUTPATIENT)
Age: 70
End: 2025-06-13
Payer: COMMERCIAL

## 2025-06-13 VITALS — BODY MASS INDEX: 25.03 KG/M2 | WEIGHT: 136 LBS | HEIGHT: 62 IN

## 2025-06-13 DIAGNOSIS — M99.02 SEGMENTAL DYSFUNCTION OF THORACIC REGION: ICD-10-CM

## 2025-06-13 DIAGNOSIS — M62.838 NECK MUSCLE SPASM: ICD-10-CM

## 2025-06-13 DIAGNOSIS — M99.01 SEGMENTAL DYSFUNCTION OF CERVICAL REGION: ICD-10-CM

## 2025-06-13 DIAGNOSIS — M99.03 SEGMENTAL DYSFUNCTION OF LUMBAR REGION: ICD-10-CM

## 2025-06-13 DIAGNOSIS — M47.812 CERVICAL SPONDYLOSIS: Primary | ICD-10-CM

## 2025-06-13 PROCEDURE — 98941 CHIROPRACT MANJ 3-4 REGIONS: CPT | Performed by: CHIROPRACTOR

## 2025-06-13 NOTE — PROGRESS NOTES
Diagnoses and all orders for this visit:    Cervical spondylosis    Segmental dysfunction of thoracic region    Neck muscle spasm    Segmental dysfunction of cervical region    Segmental dysfunction of lumbar region     DATE OF FIRST VISIT: 11/22/24    ASSESSMENT:   The symptoms and exam findings consistent with spondylosis of cervical spine secondary to repetitive st/sp injury, exacerbated by postural/ergonomic stressors. Pt responded well to stretches and manual mobilization of the affected spinal and myofascial tissues with increased ROM; trial of conservative tx recommended consisting of stretching, ther-ex, graded mobilization/manipulation of the affected spinal/myofascial tissues, postural/ergonomic education and take home stretches/exercises. If symptoms fail to improve with short trial of conservative care, appropriate imaging and referral will be coordinated.  - Tolerated treatment well with decrease in tightness and pain.  6/13/25- The patient is feeling better a little better overall, F/U once a month.     PROCEDURE CODES: 42472-XZ    TREATMENT:  Fear avoidance behavior discussion, encouraged and reassured pt that natural course of condition is to improve over time with adherence to tx plan and home care strategies. Home care recommendations: avoid bed rest, walk (but avoid trails and uneven surfaces), gradual return to activity to tolerance (avoid anything that peripheralizes symptoms), ice 20 min on/off, watch for ice burn, call if symptoms peripheralize, worsen, or neurologic deficit progresses. Ther-ex: IASTM - discussed post procedure soreness and/or ecchymosis for up to 36 hrs, applied to affected mm hypertonicities; wall kandi, axial retraction, upper trap stretch, lev scap stretch, SCM stretch, lat rows with t-band, lat pull down with t-band, abdominal bracing; greater than 15 min spent performing above mentioned ther-ex. Thoracic mobilization/manipulation: prone P-A mob; cervical  mobilization/manipulation: diversified supine graded mobilization, cervical traction    HPI:  Neema Leger is a 69 y.o. female   Chief Complaint   Patient presents with   • Neck Pain     Neck pain-1   • Shoulder Pain     Shoulder pain-1   • Back Pain     Middle back pain-1     The patient presents to the office with increase pain in the mid back and into the neck that started on Labor day without traumatic event. The patient knows it has to do with stress which was high in June 2024. The patient lost several dear friends since then. Stress is lessening now. The patient has dull pain into the arms that also shoots R thumb. Weakness is getting worse. She started PT recently and its going well. The patient went to Kathi Vance DO her PCP and the patient was referred here for consult. Pain can at times wake her up but recently its been better. Valerian root helps her sleep. Pain is moderate today but at times when they spasm can be a 10/10. No HA.  12/16- The patient reports more stiff and achiness today more than pain 6/10. But the weekend before she had a lot of spasms do to the physical activities  12/23- Thd patient is achy 5/10 in the neck and mid back and lower back today.  1/10/25- The patient reports overall she is improvements. Still has spasms when she overdoes it.   1/17/25- The patient has increased pain into the arms today. 5/10 in the neck and shoulder, and 3/10 in the mid back.   1/24-The pt 7/10 in the neck and mid back and into the shoulders all are worse today.  2/3- The patient has achiness in the neck and L shoulder. 3/10 pain total  - Shoulders and mi back are all 3/10, she has the most pain still at night but mentions the she has improvements with rubbing it like I do in her appts.  2/24/25- The patient reports she overall had a good week,   3/3/25- The patient is feeling stiff and sore 5/10  3/17- The pt had improvements with steroids prescribed by doctor for a few days but pain returned with lower  dose. MRI is coming up. 2/10.  3/24/25- The patient has more spasms in the neck.   3/31/25- The patient is feeling crappy today,  3/10.   4/23/25- The patient presents 2/10 with pain.   5/23/25- The pt is feeling a little better overall.  6/13/25- The patient is feeling much better overall, pain level is 1/10.    Neck Pain     Shoulder Pain     Back Pain    Past Medical History:   Diagnosis Date   • Seasonal allergies    • Vertigo       The following portions of the patient's history were reviewed and updated as appropriate: allergies, past family history, past medical history, past social history, past surgical history, and problem list.  Review of Systems   Musculoskeletal:  Positive for arthralgias, back pain, myalgias, neck pain and neck stiffness.     Physical Exam  Constitutional:       Appearance: Normal appearance.     Musculoskeletal:         General: Tenderness present. Normal range of motion.        Arms:       Cervical back: Normal range of motion. Rigidity and tenderness present.     Neurological:      Mental Status: She is alert and oriented to person, place, and time.      Gait: Gait is intact.      Deep Tendon Reflexes: Reflexes are normal and symmetric.     Psychiatric:         Attention and Perception: Attention normal.         Mood and Affect: Affect normal.         Speech: Speech normal.         Behavior: Behavior is cooperative.         Cognition and Memory: Cognition normal.     SOFT TISSUE ASSESSMENT: Hypertonicity and tenderness palpated C5-L1 erector spinae, upper/middle traps, lev scap, SCM, scalene, rhomboid, suboccipitals JOINT RECTRICTIONS: C5-T6, T10-L1 ORTHO: Tatyana unremarkable for centralization/peripheralization; max foraminal comp elicits local np R/L; shoulder depression elicits stiffness in R/L upper trap; brachial plexus tension test elicits neural tension in R/L UE; cervical distraction elicits relieves CC    Return in about 1 month (around 7/13/2025) for Recheck.

## 2025-07-17 ENCOUNTER — TELEPHONE (OUTPATIENT)
Age: 70
End: 2025-07-17

## 2025-07-17 NOTE — TELEPHONE ENCOUNTER
Pt has [facility]. On 3/3-3/17and 3/24-It went to the secondary. Now she is getting calls and notification that she owes the April appt. The secondary insurance does not have the bills.  Please send this to:    To the secondary insurance # [phone]    Please call pt when this is sent to the insurance # [phone]

## 2025-07-17 NOTE — TELEPHONE ENCOUNTER
Spoke with patient about billing issue. Patient stated she will bring contact information for the representative she has been speaking with at her secondary insurance company to her appointment tomorrow to figure out what exactly they want sent over.

## 2025-07-18 ENCOUNTER — PROCEDURE VISIT (OUTPATIENT)
Age: 70
End: 2025-07-18
Payer: COMMERCIAL

## 2025-07-18 VITALS — WEIGHT: 136 LBS | BODY MASS INDEX: 25.03 KG/M2 | HEIGHT: 62 IN

## 2025-07-18 DIAGNOSIS — M47.812 CERVICAL SPONDYLOSIS: Primary | ICD-10-CM

## 2025-07-18 DIAGNOSIS — M99.02 SEGMENTAL DYSFUNCTION OF THORACIC REGION: ICD-10-CM

## 2025-07-18 DIAGNOSIS — M62.838 NECK MUSCLE SPASM: ICD-10-CM

## 2025-07-18 DIAGNOSIS — M99.01 SEGMENTAL DYSFUNCTION OF CERVICAL REGION: ICD-10-CM

## 2025-07-18 DIAGNOSIS — M99.03 SEGMENTAL DYSFUNCTION OF LUMBAR REGION: ICD-10-CM

## 2025-07-18 PROCEDURE — 98941 CHIROPRACT MANJ 3-4 REGIONS: CPT | Performed by: CHIROPRACTOR

## 2025-07-18 NOTE — PROGRESS NOTES
Diagnoses and all orders for this visit:    Cervical spondylosis    Segmental dysfunction of thoracic region    Neck muscle spasm    Segmental dysfunction of cervical region    Segmental dysfunction of lumbar region     DATE OF FIRST VISIT: 11/22/24    ASSESSMENT:   The symptoms and exam findings consistent with spondylosis of cervical spine secondary to repetitive st/sp injury, exacerbated by postural/ergonomic stressors. Pt responded well to stretches and manual mobilization of the affected spinal and myofascial tissues with increased ROM; trial of conservative tx recommended consisting of stretching, ther-ex, graded mobilization/manipulation of the affected spinal/myofascial tissues, postural/ergonomic education and take home stretches/exercises. If symptoms fail to improve with short trial of conservative care, appropriate imaging and referral will be coordinated.  - Tolerated treatment well with decrease in tightness and pain.  6/13/25- The patient is feeling better a little better overall, F/U once a month.   7/18/25- The patient has decrease in pain and mm spasm post-treatment    PROCEDURE CODES: 20664-IA    TREATMENT:  Fear avoidance behavior discussion, encouraged and reassured pt that natural course of condition is to improve over time with adherence to tx plan and home care strategies. Home care recommendations: avoid bed rest, walk (but avoid trails and uneven surfaces), gradual return to activity to tolerance (avoid anything that peripheralizes symptoms), ice 20 min on/off, watch for ice burn, call if symptoms peripheralize, worsen, or neurologic deficit progresses. Ther-ex: IASTM - discussed post procedure soreness and/or ecchymosis for up to 36 hrs, applied to affected mm hypertonicities; wall kandi, axial retraction, upper trap stretch, lev scap stretch, SCM stretch, lat rows with t-band, lat pull down with t-band, abdominal bracing; greater than 15 min spent performing above mentioned ther-ex.  Thoracic mobilization/manipulation: prone P-A mob; cervical mobilization/manipulation: diversified supine graded mobilization, cervical traction    HPI:  Neema Leger is a 69 y.o. female   Chief Complaint   Patient presents with   • Neck Pain     Neck pain-3   • Shoulder Pain     Shoulder pain-3   • Back Pain     Middle back pain-1     The patient presents to the office with increase pain in the mid back and into the neck that started on Labor day without traumatic event. The patient knows it has to do with stress which was high in June 2024. The patient lost several dear friends since then. Stress is lessening now. The patient has dull pain into the arms that also shoots R thumb. Weakness is getting worse. She started PT recently and its going well. The patient went to Kathi Vance DO her PCP and the patient was referred here for consult. Pain can at times wake her up but recently its been better. Valerian root helps her sleep. Pain is moderate today but at times when they spasm can be a 10/10. No HA.  12/16- The patient reports more stiff and achiness today more than pain 6/10. But the weekend before she had a lot of spasms do to the physical activities  12/23- Thd patient is achy 5/10 in the neck and mid back and lower back today.  1/10/25- The patient reports overall she is improvements. Still has spasms when she overdoes it.   1/17/25- The patient has increased pain into the arms today. 5/10 in the neck and shoulder, and 3/10 in the mid back.   1/24-The pt 7/10 in the neck and mid back and into the shoulders all are worse today.  2/3- The patient has achiness in the neck and L shoulder. 3/10 pain total  - Shoulders and mi back are all 3/10, she has the most pain still at night but mentions the she has improvements with rubbing it like I do in her appts.  2/24/25- The patient reports she overall had a good week,   3/3/25- The patient is feeling stiff and sore 5/10  3/17- The pt had improvements with steroids  prescribed by doctor for a few days but pain returned with lower dose. MRI is coming up. 2/10.  3/24/25- The patient has more spasms in the neck.   3/31/25- The patient is feeling crappy today,  3/10.   4/23/25- The patient presents 2/10 with pain.   5/23/25- The pt is feeling a little better overall.  6/13/25- The patient is feeling much better overall, pain level is 1/10.  7/18/25- The patient is feeling a little more sore today especially in the L neck 3/10.    Neck Pain     Shoulder Pain     Back Pain      Past Medical History:   Diagnosis Date   • Seasonal allergies    • Vertigo       The following portions of the patient's history were reviewed and updated as appropriate: allergies, past family history, past medical history, past social history, past surgical history, and problem list.  Review of Systems   Musculoskeletal:  Positive for arthralgias, back pain, myalgias, neck pain and neck stiffness.     Physical Exam  Constitutional:       Appearance: Normal appearance.     Musculoskeletal:         General: Tenderness present. Normal range of motion.        Arms:       Cervical back: Normal range of motion. Rigidity and tenderness present.     Neurological:      Mental Status: She is alert and oriented to person, place, and time.      Gait: Gait is intact.      Deep Tendon Reflexes: Reflexes are normal and symmetric.     Psychiatric:         Attention and Perception: Attention normal.         Mood and Affect: Affect normal.         Speech: Speech normal.         Behavior: Behavior is cooperative.         Cognition and Memory: Cognition normal.       SOFT TISSUE ASSESSMENT: Hypertonicity and tenderness palpated C5-L1 erector spinae, upper/middle traps, lev scap, SCM, scalene, rhomboid, suboccipitals JOINT RECTRICTIONS: C5-T6, T10-L1 ORTHO: Tatyana unremarkable for centralization/peripheralization; max foraminal comp elicits local np R/L; shoulder depression elicits stiffness in R/L upper trap; brachial plexus  tension test elicits neural tension in R/L UE; cervical distraction elicits relieves CC    Return in about 3 weeks (around 8/8/2025) for Recheck.

## 2025-07-30 ENCOUNTER — TELEPHONE (OUTPATIENT)
Age: 70
End: 2025-07-30

## 2025-07-30 DIAGNOSIS — Z12.31 ENCOUNTER FOR SCREENING MAMMOGRAM FOR MALIGNANT NEOPLASM OF BREAST: Primary | ICD-10-CM

## 2025-08-15 ENCOUNTER — PROCEDURE VISIT (OUTPATIENT)
Age: 70
End: 2025-08-15
Payer: COMMERCIAL